# Patient Record
Sex: MALE | Race: BLACK OR AFRICAN AMERICAN | NOT HISPANIC OR LATINO | URBAN - METROPOLITAN AREA
[De-identification: names, ages, dates, MRNs, and addresses within clinical notes are randomized per-mention and may not be internally consistent; named-entity substitution may affect disease eponyms.]

---

## 2020-07-25 ENCOUNTER — TELEPHONE ENCOUNTER (OUTPATIENT)
Dept: URBAN - METROPOLITAN AREA CLINIC 13 | Facility: CLINIC | Age: 75
End: 2020-07-25

## 2020-07-25 RX ORDER — ESOMEPRAZOLE MAGNESIUM 40 MG
TAKE 1 CAPSULE DAILY CAPSULE,DELAYED RELEASE (ENTERIC COATED) ORAL
Refills: 0 | OUTPATIENT
End: 2019-09-16

## 2020-07-25 RX ORDER — PHENAZOPYRIDINE HYDROCHLORIDE 100 MG/1
TAKE 1 CAPSULE DAILY TABLET, COATED ORAL
Refills: 0 | OUTPATIENT
End: 2019-09-16

## 2020-07-25 RX ORDER — VARDENAFIL HYDROCHLORIDE 20 MG/1
TAKE AS DIRECTED TABLET, FILM COATED ORAL
Refills: 0 | OUTPATIENT
Start: 2011-06-21 | End: 2011-07-15

## 2020-07-25 RX ORDER — SILDENAFIL CITRATE 100 MG/1
TAKE 1 TABLET DAILY 1 HOUR BEFORE NEEDED TABLET, FILM COATED ORAL
Refills: 0 | OUTPATIENT
Start: 2011-06-20 | End: 2020-01-17

## 2020-07-25 RX ORDER — AMOXICILLIN 500 MG/1
TAKE 1 TABLET EVERY 8 HOURS TABLET, FILM COATED ORAL
Refills: 0 | OUTPATIENT
End: 2016-12-02

## 2020-07-25 RX ORDER — VARDENAFIL HYDROCHLORIDE 20 MG/1
TAKE 1 TABLET DAILY PT STATES DOSE IS 20MG TABLET, FILM COATED ORAL
Refills: 0 | OUTPATIENT
Start: 2011-07-15 | End: 2012-10-11

## 2020-07-25 RX ORDER — POLYETHYLENE GLYCOL 3350, SODIUM CHLORIDE, SODIUM BICARBONATE AND POTASSIUM CHLORIDE WITH LEMON FLAVOR 420; 11.2; 5.72; 1.48 G/4L; G/4L; G/4L; G/4L
MIX CONTENTS DAY PRIOR TO PROCEDURE, BEGIN DRINKING SOLUTION AT 5PM UNTIL HALF GONE. COMPLETE SOLUTION 6 HOURS PRIOR TO PROCEDURE POWDER, FOR SOLUTION ORAL
Qty: 1 | Refills: 0 | OUTPATIENT
Start: 2019-01-18 | End: 2019-02-26

## 2020-07-25 RX ORDER — LEVOTHYROXINE SODIUM 137 UG/1
TAKE 1 TABLET DAILY TABLET ORAL
Refills: 0 | OUTPATIENT
Start: 2011-06-21 | End: 2011-07-15

## 2020-07-25 RX ORDER — LORAZEPAM 1 MG/1
TAKE 1 TABLET DAILY TABLET ORAL
Refills: 0 | OUTPATIENT
End: 2016-12-02

## 2020-07-25 RX ORDER — FESOTERODINE FUMARATE 8 MG/1
TABLET, FILM COATED, EXTENDED RELEASE ORAL
Qty: 90 | Refills: 0 | OUTPATIENT
Start: 2019-06-04 | End: 2019-09-16

## 2020-07-26 ENCOUNTER — TELEPHONE ENCOUNTER (OUTPATIENT)
Dept: URBAN - METROPOLITAN AREA CLINIC 13 | Facility: CLINIC | Age: 75
End: 2020-07-26

## 2020-07-26 RX ORDER — BENZOCAINE 20 G/100G
GEL, DENTIFRICE ORAL
Qty: 90 | Refills: 0 | Status: ACTIVE | COMMUNITY
Start: 2018-03-12

## 2020-07-26 RX ORDER — ALBUTEROL SULFATE 90 UG/1
AEROSOL, METERED RESPIRATORY (INHALATION)
Qty: 25 | Refills: 0 | Status: ACTIVE | COMMUNITY
Start: 2019-01-17

## 2020-07-26 RX ORDER — CETIRIZINE HYDROCHLORIDE 10 MG/1
TABLET, FILM COATED ORAL
Qty: 90 | Refills: 0 | Status: ACTIVE | COMMUNITY
Start: 2019-01-17

## 2020-07-26 RX ORDER — DUTASTERIDE 0.5 MG
TAKE 1 CAPSULE DAILY CAPSULE ORAL
Refills: 0 | Status: ACTIVE | COMMUNITY

## 2020-07-26 RX ORDER — FAMOTIDINE 40 MG/1
TAKE 1 TABLET BEDTIME TABLET ORAL
Qty: 90 | Refills: 3 | Status: ACTIVE | COMMUNITY
Start: 2020-01-17

## 2020-07-26 RX ORDER — ACYCLOVIR 50 MG/G
OINTMENT TOPICAL
Qty: 15 | Refills: 0 | Status: ACTIVE | COMMUNITY
Start: 2019-09-25

## 2020-07-26 RX ORDER — AMLODIPINE BESYLATE 10 MG/1
TAKE 1 TABLET DAILY TABLET ORAL
Refills: 0 | Status: ACTIVE | COMMUNITY

## 2020-07-26 RX ORDER — FLUTICASONE PROPIONATE 44 UG/1
AEROSOL, METERED RESPIRATORY (INHALATION)
Qty: 10 | Refills: 0 | Status: ACTIVE | COMMUNITY
Start: 2019-01-24

## 2020-07-26 RX ORDER — FLUTICASONE PROPIONATE 50 UG/1
SPRAY, METERED NASAL
Qty: 16 | Refills: 0 | Status: ACTIVE | COMMUNITY
Start: 2019-10-29

## 2020-07-26 RX ORDER — POLYETHYLENE GLYCOL 3350, SODIUM CHLORIDE, SODIUM BICARBONATE AND POTASSIUM CHLORIDE WITH LEMON FLAVOR 420; 11.2; 5.72; 1.48 G/4L; G/4L; G/4L; G/4L
MIX CONTENTS DAY PRIOR TO PROCEDURE, BEGIN DRINKING SOLUTION AT 5PM UNTIL HALF GONE. COMPLETE SOLUTION 6 HOURS PRIOR TO PROCEDURE POWDER, FOR SOLUTION ORAL
Qty: 1 | Refills: 0 | Status: ACTIVE | COMMUNITY
Start: 2019-01-18

## 2020-07-26 RX ORDER — AMOXICILLIN 500 MG/1
CAPSULE ORAL
Qty: 24 | Refills: 0 | Status: ACTIVE | COMMUNITY
Start: 2018-11-29

## 2020-07-26 RX ORDER — MUPIROCIN 20 MG/G
OINTMENT TOPICAL
Qty: 88 | Refills: 0 | Status: ACTIVE | COMMUNITY
Start: 2018-12-12

## 2020-07-26 RX ORDER — WHEAT DEXTRIN 3 G/4 G
TAKE 2 TSP DAILY. AS NEEDED POWDER (GRAM) ORAL
Refills: 0 | Status: ACTIVE | COMMUNITY

## 2020-07-26 RX ORDER — HYDROCHLOROTHIAZIDE 25 MG/1
TAKE 1 TABLET DAILY TABLET ORAL
Refills: 0 | Status: ACTIVE | COMMUNITY

## 2020-07-26 RX ORDER — GABAPENTIN 300 MG/1
TAKE 1 CAPSULE DAILY CAPSULE ORAL
Refills: 0 | Status: ACTIVE | COMMUNITY

## 2020-07-26 RX ORDER — HYDROCODONE BITARTRATE AND ACETAMINOPHEN 10; 325 MG/1; MG/1
TABLET ORAL
Qty: 90 | Refills: 0 | Status: ACTIVE | COMMUNITY
Start: 2018-03-12

## 2020-07-26 RX ORDER — HYDROCODONE BITARTRATE AND ACETAMINOPHEN 5; 325 MG/1; MG/1
TAKE 1 TABLET EVERY 4 TO 6 HOURS AS NEEDED FOR PAIN TABLET ORAL
Refills: 0 | Status: ACTIVE | COMMUNITY

## 2020-07-26 RX ORDER — METHOCARBAMOL 500 MG/1
TAKE 1 TABLET AS NEEDED TABLET, FILM COATED ORAL
Refills: 0 | Status: ACTIVE | COMMUNITY

## 2020-07-26 RX ORDER — DOCUSATE SODIUM 100 MG/1
CAPSULE, LIQUID FILLED ORAL
Qty: 90 | Refills: 0 | Status: ACTIVE | COMMUNITY
Start: 2018-03-12

## 2020-07-26 RX ORDER — LEVOTHYROXINE SODIUM 0.17 MG/1
TAKE 1 TABLET DAILY TABLET ORAL
Refills: 0 | Status: ACTIVE | COMMUNITY

## 2020-07-26 RX ORDER — ATORVASTATIN CALCIUM 20 MG/1
TAKE 1 TABLET DAILY TABLET, FILM COATED ORAL
Refills: 0 | Status: ACTIVE | COMMUNITY

## 2020-07-26 RX ORDER — ONDANSETRON 8 MG/1
TAKE 1 TABLET BEFORE MEALS TABLET ORAL
Qty: 90 | Refills: 2 | Status: ACTIVE | COMMUNITY

## 2020-07-26 RX ORDER — PHENAZOPYRIDINE HYDROCHLORIDE 100 MG/1
TABLET ORAL
Qty: 120 | Refills: 0 | Status: ACTIVE | COMMUNITY
Start: 2018-01-09

## 2020-07-26 RX ORDER — DOCUSATE SODIUM 100 MG
TAKE 1 TABLET DAILY AS DIRECTED TABLET ORAL
Refills: 0 | Status: ACTIVE | COMMUNITY

## 2020-07-26 RX ORDER — FINASTERIDE 5 MG/1
TABLET, FILM COATED ORAL
Qty: 90 | Refills: 0 | Status: ACTIVE | COMMUNITY
Start: 2017-11-29

## 2020-07-26 RX ORDER — ALFUZOSIN HCL 10 MG
TAKE 1 TABLET DAILY TABLET, EXTENDED RELEASE 24 HR ORAL
Refills: 0 | Status: ACTIVE | COMMUNITY

## 2020-07-26 RX ORDER — LISINOPRIL 40 MG/1
TAKE 1 TABLET DAILY TABLET ORAL
Refills: 0 | Status: ACTIVE | COMMUNITY

## 2020-07-26 RX ORDER — LEVOTHYROXINE SODIUM 0.15 MG/1
TABLET ORAL
Qty: 90 | Refills: 0 | Status: ACTIVE | COMMUNITY
Start: 2018-09-14

## 2020-07-26 RX ORDER — SILDENAFIL CITRATE 20 MG/1
TAKE 1 TABLET DAILY. PT STATES UNKNOWN DOSAGE TABLET ORAL
Refills: 0 | Status: ACTIVE | COMMUNITY

## 2020-07-26 RX ORDER — MELOXICAM 15 MG/1
TAKE 1 TABLET DAILY TABLET ORAL
Refills: 0 | Status: ACTIVE | COMMUNITY

## 2020-07-26 RX ORDER — METOPROLOL TARTRATE 50 MG/1
TAKE 1 TABLET DAILY TABLET, FILM COATED ORAL
Refills: 0 | Status: ACTIVE | COMMUNITY

## 2020-07-26 RX ORDER — HYDROCODONE BITARTRATE AND ACETAMINOPHEN 10; 325 MG/1; MG/1
TABLET ORAL
Qty: 30 | Refills: 0 | Status: ACTIVE | COMMUNITY
Start: 2018-08-28

## 2020-07-26 RX ORDER — FINASTERIDE 5 MG/1
TAKE 1 TABLET DAILY TABLET, FILM COATED ORAL
Refills: 0 | Status: ACTIVE | COMMUNITY
Start: 2018-06-22

## 2020-07-26 RX ORDER — SIMVASTATIN 40 MG/1
TAKE 1 TABLET DAILY TABLET, FILM COATED ORAL
Refills: 0 | Status: ACTIVE | COMMUNITY

## 2020-07-26 RX ORDER — SERTRALINE HCL 100 MG
TAKE 1 TABLET DAILY AS DIRECTED TABLET ORAL
Refills: 0 | Status: ACTIVE | COMMUNITY

## 2020-07-26 RX ORDER — PRAMIPEXOLE DIHYDROCHLORIDE 0.5 MG/1
TAKE 1 TABLET AT BEDTIME TABLET ORAL
Refills: 0 | Status: ACTIVE | COMMUNITY

## 2020-07-26 RX ORDER — TADALAFIL 10 MG
TABLET ORAL
Qty: 15 | Refills: 0 | Status: ACTIVE | COMMUNITY
Start: 2018-02-14

## 2020-07-26 RX ORDER — AMOXICILLIN 500 MG/1
CAPSULE ORAL
Qty: 24 | Refills: 0 | Status: ACTIVE | COMMUNITY
Start: 2017-08-14

## 2020-07-26 RX ORDER — OXYBUTYNIN 5 MG/1
TABLET, FILM COATED, EXTENDED RELEASE ORAL
Qty: 180 | Refills: 0 | Status: ACTIVE | COMMUNITY
Start: 2019-03-20

## 2020-07-26 RX ORDER — PANTOPRAZOLE SODIUM 40 MG/1
TAKE 1 TABLET DAILY TABLET, DELAYED RELEASE ORAL
Qty: 1 | Refills: 2 | Status: ACTIVE | COMMUNITY
Start: 2019-09-16

## 2020-07-26 RX ORDER — PANTOPRAZOLE SODIUM 40 MG/1
TAKE 1 TABLET DAILY TABLET, DELAYED RELEASE ORAL
Qty: 5 | Refills: 2 | Status: ACTIVE | COMMUNITY
Start: 2018-03-12

## 2020-07-26 RX ORDER — AMOXICILLIN 500 MG/1
CAPSULE ORAL
Qty: 24 | Refills: 0 | Status: ACTIVE | COMMUNITY
Start: 2018-12-03

## 2020-07-26 RX ORDER — TADALAFIL 20 MG/1
TAKE 1 TABLET DAILY 1 HOUR BEFORE NEEDED TABLET, FILM COATED ORAL
Refills: 0 | Status: ACTIVE | COMMUNITY
Start: 2018-06-14

## 2020-07-26 RX ORDER — ALBUTEROL SULFATE 90 UG/1
AEROSOL, METERED RESPIRATORY (INHALATION)
Qty: 25 | Refills: 0 | Status: ACTIVE | COMMUNITY
Start: 2018-03-12

## 2020-07-26 RX ORDER — LEVOTHYROXINE SODIUM 0.15 MG/1
TABLET ORAL
Qty: 90 | Refills: 0 | Status: ACTIVE | COMMUNITY
Start: 2018-09-20

## 2020-07-26 RX ORDER — MUPIROCIN 20 MG/G
OINTMENT TOPICAL
Qty: 22 | Refills: 0 | Status: ACTIVE | COMMUNITY
Start: 2018-03-12

## 2020-12-30 ENCOUNTER — ERX REFILL RESPONSE (OUTPATIENT)
Age: 75
End: 2020-12-30

## 2020-12-30 RX ORDER — FAMOTIDINE 40 MG/1
TAKE 1 TABLET AT BEDTIME TABLET ORAL
Qty: 90 | Refills: 3

## 2021-04-20 ENCOUNTER — WEB ENCOUNTER (OUTPATIENT)
Dept: URBAN - METROPOLITAN AREA CLINIC 113 | Facility: CLINIC | Age: 76
End: 2021-04-20

## 2021-04-20 ENCOUNTER — OFFICE VISIT (OUTPATIENT)
Dept: URBAN - METROPOLITAN AREA CLINIC 113 | Facility: CLINIC | Age: 76
End: 2021-04-20
Payer: MEDICARE

## 2021-04-20 VITALS
HEIGHT: 68 IN | DIASTOLIC BLOOD PRESSURE: 72 MMHG | HEART RATE: 95 BPM | SYSTOLIC BLOOD PRESSURE: 115 MMHG | WEIGHT: 151 LBS | TEMPERATURE: 98.4 F | BODY MASS INDEX: 22.88 KG/M2

## 2021-04-20 DIAGNOSIS — K57.32 DIVERTICULITIS LARGE INTESTINE: ICD-10-CM

## 2021-04-20 DIAGNOSIS — K57.92 DIVERTICULITIS: ICD-10-CM

## 2021-04-20 DIAGNOSIS — R10.31 RIGHT LOWER QUADRANT ABDOMINAL PAIN: ICD-10-CM

## 2021-04-20 DIAGNOSIS — K59.01 SLOW TRANSIT CONSTIPATION: ICD-10-CM

## 2021-04-20 DIAGNOSIS — K62.5 BRIGHT RED BLOOD PER RECTUM: ICD-10-CM

## 2021-04-20 PROCEDURE — 99214 OFFICE O/P EST MOD 30 MIN: CPT | Performed by: NURSE PRACTITIONER

## 2021-04-20 PROCEDURE — 74177 CT ABD & PELVIS W/CONTRAST: CPT | Performed by: NURSE PRACTITIONER

## 2021-04-20 RX ORDER — METHOCARBAMOL 500 MG/1
1 TABLET TABLET ORAL ONCE DAILY
Status: ACTIVE | COMMUNITY

## 2021-04-20 RX ORDER — MONTELUKAST SODIUM 10 MG/1
1 TABLET TABLET, FILM COATED ORAL ONCE A DAY
Status: ACTIVE | COMMUNITY

## 2021-04-20 RX ORDER — SILDENAFIL CITRATE 20 MG/1
TAKE 1 TABLET DAILY. PT STATES UNKNOWN DOSAGE TABLET ORAL
Refills: 0 | Status: ON HOLD | COMMUNITY

## 2021-04-20 RX ORDER — AMLODIPINE BESYLATE 10 MG/1
TAKE 1 TABLET DAILY TABLET ORAL
Refills: 0 | Status: ACTIVE | COMMUNITY

## 2021-04-20 RX ORDER — GABAPENTIN 300 MG/1
TAKE 1 CAPSULE DAILY CAPSULE ORAL
Refills: 0 | Status: ACTIVE | COMMUNITY

## 2021-04-20 RX ORDER — PHENAZOPYRIDINE HYDROCHLORIDE 100 MG/1
TABLET ORAL
Qty: 120 | Refills: 0 | Status: ON HOLD | COMMUNITY
Start: 2018-01-09

## 2021-04-20 RX ORDER — ONDANSETRON 8 MG/1
TAKE 1 TABLET BEFORE MEALS TABLET ORAL
Qty: 90 | Refills: 2 | Status: ACTIVE | COMMUNITY

## 2021-04-20 RX ORDER — METHOCARBAMOL 500 MG/1
TAKE 1 TABLET AS NEEDED TABLET, FILM COATED ORAL
Refills: 0 | Status: ON HOLD | COMMUNITY

## 2021-04-20 RX ORDER — BENZOCAINE 20 G/100G
GEL, DENTIFRICE ORAL
Qty: 90 | Refills: 0 | Status: ON HOLD | COMMUNITY
Start: 2018-03-12

## 2021-04-20 RX ORDER — MELOXICAM 15 MG/1
TAKE 1 TABLET DAILY TABLET ORAL
Refills: 0 | Status: ON HOLD | COMMUNITY

## 2021-04-20 RX ORDER — METOPROLOL TARTRATE 50 MG/1
TAKE 1 TABLET DAILY TABLET, FILM COATED ORAL
Refills: 0 | Status: ON HOLD | COMMUNITY

## 2021-04-20 RX ORDER — PANTOPRAZOLE SODIUM 40 MG/1
TAKE 1 TABLET DAILY TABLET, DELAYED RELEASE ORAL
Qty: 5 | Refills: 2 | Status: ACTIVE | COMMUNITY
Start: 2018-03-12

## 2021-04-20 RX ORDER — WHEAT DEXTRIN 3 G/4 G
TAKE 2 TSP DAILY. AS NEEDED POWDER (GRAM) ORAL
Refills: 0 | Status: ON HOLD | COMMUNITY

## 2021-04-20 RX ORDER — DUTASTERIDE 0.5 MG
TAKE 1 CAPSULE DAILY CAPSULE ORAL
Refills: 0 | Status: ON HOLD | COMMUNITY

## 2021-04-20 RX ORDER — OXYBUTYNIN 5 MG/1
TABLET, FILM COATED, EXTENDED RELEASE ORAL
Qty: 180 | Refills: 0 | Status: ON HOLD | COMMUNITY
Start: 2019-03-20

## 2021-04-20 RX ORDER — DOCUSATE SODIUM 100 MG
TAKE 1 TABLET DAILY AS DIRECTED TABLET ORAL
Refills: 0 | Status: ACTIVE | COMMUNITY

## 2021-04-20 RX ORDER — DICYCLOMINE HYDROCHLORIDE 10 MG/1
1 CAPSULE CAPSULE ORAL
Qty: 60 | Refills: 1 | OUTPATIENT
Start: 2021-04-20 | End: 2021-06-19

## 2021-04-20 RX ORDER — SERTRALINE HCL 100 MG
TAKE 1 TABLET DAILY AS DIRECTED TABLET ORAL
Refills: 0 | Status: ON HOLD | COMMUNITY

## 2021-04-20 RX ORDER — FAMOTIDINE 40 MG/1
TAKE 1 TABLET AT BEDTIME TABLET ORAL
Qty: 90 | Refills: 3 | Status: ON HOLD | COMMUNITY

## 2021-04-20 RX ORDER — TADALAFIL 10 MG
TABLET ORAL
Qty: 15 | Refills: 0 | Status: ON HOLD | COMMUNITY
Start: 2018-02-14

## 2021-04-20 RX ORDER — PRAMIPEXOLE DIHYDROCHLORIDE 0.5 MG/1
TAKE 1 TABLET AT BEDTIME TABLET ORAL
Refills: 0 | Status: ON HOLD | COMMUNITY

## 2021-04-20 RX ORDER — HYDROCHLOROTHIAZIDE 25 MG/1
TAKE 1 TABLET DAILY TABLET ORAL
Refills: 0 | Status: ACTIVE | COMMUNITY

## 2021-04-20 RX ORDER — TADALAFIL 20 MG/1
TAKE 1 TABLET DAILY 1 HOUR BEFORE NEEDED TABLET, FILM COATED ORAL
Refills: 0 | Status: ON HOLD | COMMUNITY
Start: 2018-06-14

## 2021-04-20 RX ORDER — FLUTICASONE PROPIONATE 50 UG/1
SPRAY, METERED NASAL
Qty: 16 | Refills: 0 | Status: ACTIVE | COMMUNITY
Start: 2019-10-29

## 2021-04-20 RX ORDER — LEVOTHYROXINE SODIUM 0.17 MG/1
TAKE 1 TABLET DAILY TABLET ORAL
Refills: 0 | Status: ON HOLD | COMMUNITY

## 2021-04-20 RX ORDER — TRAZODONE HYDROCHLORIDE 100 MG/1
2 TABLET AT BEDTIME TABLET, FILM COATED ORAL ONCE A DAY
Status: ACTIVE | COMMUNITY

## 2021-04-20 RX ORDER — POLYETHYLENE GLYCOL 3350, SODIUM CHLORIDE, SODIUM BICARBONATE AND POTASSIUM CHLORIDE WITH LEMON FLAVOR 420; 11.2; 5.72; 1.48 G/4L; G/4L; G/4L; G/4L
MIX CONTENTS DAY PRIOR TO PROCEDURE, BEGIN DRINKING SOLUTION AT 5PM UNTIL HALF GONE. COMPLETE SOLUTION 6 HOURS PRIOR TO PROCEDURE POWDER, FOR SOLUTION ORAL
Qty: 1 | Refills: 0 | Status: ON HOLD | COMMUNITY
Start: 2019-01-18

## 2021-04-20 RX ORDER — HYDROCODONE BITARTRATE AND ACETAMINOPHEN 10; 325 MG/1; MG/1
TABLET ORAL
Qty: 90 | Refills: 0 | Status: ON HOLD | COMMUNITY
Start: 2018-03-12

## 2021-04-20 RX ORDER — LISINOPRIL 40 MG/1
TAKE 1 TABLET DAILY TABLET ORAL
Refills: 0 | Status: ACTIVE | COMMUNITY

## 2021-04-20 RX ORDER — LEVOTHYROXINE SODIUM 0.15 MG/1
TABLET ORAL
Qty: 90 | Refills: 0 | Status: ACTIVE | COMMUNITY
Start: 2018-09-14

## 2021-04-20 RX ORDER — AMOXICILLIN 500 MG/1
CAPSULE ORAL
Qty: 24 | Refills: 0 | Status: ON HOLD | COMMUNITY
Start: 2017-08-14

## 2021-04-20 RX ORDER — SERTRALINE 100 MG/1
1 TABLET TABLET, FILM COATED ORAL ONCE A DAY
Status: ACTIVE | COMMUNITY

## 2021-04-20 RX ORDER — CETIRIZINE HYDROCHLORIDE 10 MG/1
TABLET, FILM COATED ORAL
Qty: 90 | Refills: 0 | Status: ON HOLD | COMMUNITY
Start: 2019-01-17

## 2021-04-20 RX ORDER — ATORVASTATIN CALCIUM 20 MG/1
TAKE 1 TABLET DAILY TABLET, FILM COATED ORAL
Refills: 0 | Status: ACTIVE | COMMUNITY

## 2021-04-20 RX ORDER — FLUTICASONE PROPIONATE 44 UG/1
AEROSOL, METERED RESPIRATORY (INHALATION)
Qty: 10 | Refills: 0 | Status: ON HOLD | COMMUNITY
Start: 2019-01-24

## 2021-04-20 RX ORDER — SIMVASTATIN 40 MG/1
TAKE 1 TABLET DAILY TABLET, FILM COATED ORAL
Refills: 0 | Status: ON HOLD | COMMUNITY

## 2021-04-20 RX ORDER — ALBUTEROL SULFATE 90 UG/1
AEROSOL, METERED RESPIRATORY (INHALATION)
Qty: 25 | Refills: 0 | Status: ON HOLD | COMMUNITY
Start: 2018-03-12

## 2021-04-20 RX ORDER — FINASTERIDE 5 MG/1
TABLET, FILM COATED ORAL
Qty: 90 | Refills: 0 | Status: ACTIVE | COMMUNITY
Start: 2017-11-29

## 2021-04-20 RX ORDER — DOCUSATE SODIUM 100 MG/1
CAPSULE, LIQUID FILLED ORAL
Qty: 90 | Refills: 0 | Status: ON HOLD | COMMUNITY
Start: 2018-03-12

## 2021-04-20 RX ORDER — PRAMIPEXOLE DIHYDROCHLORIDE 0.5 MG/1
1 TABLET TABLET ORAL ONCE A DAY
Status: ACTIVE | COMMUNITY

## 2021-04-20 RX ORDER — MUPIROCIN 20 MG/G
OINTMENT TOPICAL
Qty: 22 | Refills: 0 | Status: ACTIVE | COMMUNITY
Start: 2018-03-12

## 2021-04-20 RX ORDER — HYDROCODONE BITARTRATE AND ACETAMINOPHEN 5; 325 MG/1; MG/1
TAKE 1 TABLET EVERY 4 TO 6 HOURS AS NEEDED FOR PAIN TABLET ORAL
Refills: 0 | Status: ON HOLD | COMMUNITY

## 2021-04-20 RX ORDER — ACYCLOVIR 50 MG/G
OINTMENT TOPICAL
Qty: 15 | Refills: 0 | Status: ACTIVE | COMMUNITY
Start: 2019-09-25

## 2021-04-20 RX ORDER — ALFUZOSIN HCL 10 MG
TAKE 1 TABLET DAILY TABLET, EXTENDED RELEASE 24 HR ORAL
Refills: 0 | Status: ON HOLD | COMMUNITY

## 2021-04-20 RX ORDER — DUTASTERIDE 0.5 MG/1
1 CAPSULE CAPSULE, LIQUID FILLED ORAL ONCE A DAY
Status: ACTIVE | COMMUNITY

## 2021-04-20 RX ORDER — TADALAFIL 20 MG/1
1 TABLET TABLET, COATED ORAL ONCE A DAY
Status: ACTIVE | COMMUNITY

## 2021-04-20 NOTE — HPI-OTHER HISTORIES
EGD 9/20/19 revealed an irregular Z line, [place]-colored mucosa from 39-40 cm, gastroesophageal flap valve classified as Hill grade II, small hiatal hernia, diffuse moderate gastritis and normal duodenum. GE junction biopsies revealed reflux type changes, negative for Petit's esophagus, gastric biopsies revealed multifocal atrophic gastritis with extensive complete intestinal metaplasia, negative for H. pylori or malignancy.  Colonoscopy (8/5/14) by Dr. Tapia revealed multiple diverticula in the entire colon and otherwise normal colon. from home c/o fever  dehydration   URI   back pain

## 2021-04-20 NOTE — HPI-TODAY'S VISIT:
75-year-old male with a history of hypertension, stage III chronic kidney disease, sleep apnea, thyroid cancer, prostate cancer, GERD, presenting for evaluation of diverticulitis.   He was last seen 1/17/2020 for follow-up regarding GERD, with persistent symptoms despite PPI.  He was instructed to continue pantoprazole 40 mg daily, and famotidine 40 mg was added at bedtime.  In early March, he experienced an exacerbation of right-sided abdominal pain and rectal bleeding.  He was empirically treated for diverticulitis with a 7-day course of Cipro and Flagyl per his PCP.  His red blood per rectum resolved following completion of antibiotics, but abdominal pain persists.  He complains of persistent sharp right lower quadrant discomfort which is not related to eating or defecation.  He reports associated nausea, with several episodes of vomiting at onset.  He is having a nonbloody stool every 2 days with Colace.  No recent labs or abdominal imaging.

## 2021-04-22 ENCOUNTER — TELEPHONE ENCOUNTER (OUTPATIENT)
Dept: URBAN - METROPOLITAN AREA CLINIC 113 | Facility: CLINIC | Age: 76
End: 2021-04-22

## 2021-05-03 ENCOUNTER — TELEPHONE ENCOUNTER (OUTPATIENT)
Dept: URBAN - METROPOLITAN AREA CLINIC 113 | Facility: CLINIC | Age: 76
End: 2021-05-03

## 2021-05-13 ENCOUNTER — TELEPHONE ENCOUNTER (OUTPATIENT)
Dept: URBAN - METROPOLITAN AREA CLINIC 113 | Facility: CLINIC | Age: 76
End: 2021-05-13

## 2021-05-17 PROBLEM — 307496006 DIVERTICULITIS: Status: ACTIVE | Noted: 2021-04-20

## 2021-05-17 PROBLEM — 35298007 SLOW TRANSIT CONSTIPATION: Status: ACTIVE | Noted: 2021-04-20

## 2021-05-19 ENCOUNTER — OFFICE VISIT (OUTPATIENT)
Dept: URBAN - METROPOLITAN AREA SURGERY CENTER 25 | Facility: SURGERY CENTER | Age: 76
End: 2021-05-19
Payer: MEDICARE

## 2021-05-19 ENCOUNTER — CLAIMS CREATED FROM THE CLAIM WINDOW (OUTPATIENT)
Dept: URBAN - METROPOLITAN AREA CLINIC 4 | Facility: CLINIC | Age: 76
End: 2021-05-19
Payer: MEDICARE

## 2021-05-19 DIAGNOSIS — C88.4 EXTRANODAL MARGINAL ZONE B-CELL LYMPHOMA OF MUCOSA-ASSOCIATED LYMPHOID TISSUE [MALT-LYMPHOMA]: ICD-10-CM

## 2021-05-19 DIAGNOSIS — R93.3 ABN FINDINGS-GI TRACT: ICD-10-CM

## 2021-05-19 DIAGNOSIS — K31.89 ACQUIRED DEFORMITY OF DUODENUM: ICD-10-CM

## 2021-05-19 DIAGNOSIS — K31.89 GASTRIC PERFORATION WITHOUT ULCER: ICD-10-CM

## 2021-05-19 DIAGNOSIS — K29.50 CHRONIC GASTRITIS: ICD-10-CM

## 2021-05-19 DIAGNOSIS — K22.8 OTHER SPECIFIED DISEASES OF ESOPHAGUS: ICD-10-CM

## 2021-05-19 DIAGNOSIS — K29.40 ATROPHIC GASTRITIS: ICD-10-CM

## 2021-05-19 PROCEDURE — 88312 SPECIAL STAINS GROUP 1: CPT | Performed by: PATHOLOGY

## 2021-05-19 PROCEDURE — 43239 EGD BIOPSY SINGLE/MULTIPLE: CPT | Performed by: INTERNAL MEDICINE

## 2021-05-19 PROCEDURE — G8907 PT DOC NO EVENTS ON DISCHARG: HCPCS | Performed by: INTERNAL MEDICINE

## 2021-05-19 PROCEDURE — 88305 TISSUE EXAM BY PATHOLOGIST: CPT | Performed by: PATHOLOGY

## 2021-05-19 RX ORDER — SERTRALINE HCL 100 MG
TAKE 1 TABLET DAILY AS DIRECTED TABLET ORAL
Refills: 0 | Status: ON HOLD | COMMUNITY

## 2021-05-19 RX ORDER — WHEAT DEXTRIN 3 G/4 G
TAKE 2 TSP DAILY. AS NEEDED POWDER (GRAM) ORAL
Refills: 0 | Status: ON HOLD | COMMUNITY

## 2021-05-19 RX ORDER — LISINOPRIL 40 MG/1
TAKE 1 TABLET DAILY TABLET ORAL
Refills: 0 | Status: ACTIVE | COMMUNITY

## 2021-05-19 RX ORDER — FLUTICASONE PROPIONATE 50 UG/1
SPRAY, METERED NASAL
Qty: 16 | Refills: 0 | Status: ACTIVE | COMMUNITY
Start: 2019-10-29

## 2021-05-19 RX ORDER — ALBUTEROL SULFATE 90 UG/1
AEROSOL, METERED RESPIRATORY (INHALATION)
Qty: 25 | Refills: 0 | Status: ON HOLD | COMMUNITY
Start: 2018-03-12

## 2021-05-19 RX ORDER — TADALAFIL 20 MG/1
1 TABLET TABLET, COATED ORAL ONCE A DAY
Status: ACTIVE | COMMUNITY

## 2021-05-19 RX ORDER — LEVOTHYROXINE SODIUM 0.17 MG/1
TAKE 1 TABLET DAILY TABLET ORAL
Refills: 0 | Status: ON HOLD | COMMUNITY

## 2021-05-19 RX ORDER — SERTRALINE 100 MG/1
1 TABLET TABLET, FILM COATED ORAL ONCE A DAY
Status: ACTIVE | COMMUNITY

## 2021-05-19 RX ORDER — OXYBUTYNIN 5 MG/1
TABLET, FILM COATED, EXTENDED RELEASE ORAL
Qty: 180 | Refills: 0 | Status: ON HOLD | COMMUNITY
Start: 2019-03-20

## 2021-05-19 RX ORDER — MONTELUKAST SODIUM 10 MG/1
1 TABLET TABLET, FILM COATED ORAL ONCE A DAY
Status: ACTIVE | COMMUNITY

## 2021-05-19 RX ORDER — PRAMIPEXOLE DIHYDROCHLORIDE 0.5 MG/1
1 TABLET TABLET ORAL ONCE A DAY
Status: ACTIVE | COMMUNITY

## 2021-05-19 RX ORDER — FLUTICASONE PROPIONATE 44 UG/1
AEROSOL, METERED RESPIRATORY (INHALATION)
Qty: 10 | Refills: 0 | Status: ON HOLD | COMMUNITY
Start: 2019-01-24

## 2021-05-19 RX ORDER — AMOXICILLIN 500 MG/1
CAPSULE ORAL
Qty: 24 | Refills: 0 | Status: ON HOLD | COMMUNITY
Start: 2017-08-14

## 2021-05-19 RX ORDER — HYDROCHLOROTHIAZIDE 25 MG/1
TAKE 1 TABLET DAILY TABLET ORAL
Refills: 0 | Status: ACTIVE | COMMUNITY

## 2021-05-19 RX ORDER — MUPIROCIN 20 MG/G
OINTMENT TOPICAL
Qty: 22 | Refills: 0 | Status: ACTIVE | COMMUNITY
Start: 2018-03-12

## 2021-05-19 RX ORDER — HYDROCODONE BITARTRATE AND ACETAMINOPHEN 5; 325 MG/1; MG/1
TAKE 1 TABLET EVERY 4 TO 6 HOURS AS NEEDED FOR PAIN TABLET ORAL
Refills: 0 | Status: ON HOLD | COMMUNITY

## 2021-05-19 RX ORDER — DUTASTERIDE 0.5 MG
TAKE 1 CAPSULE DAILY CAPSULE ORAL
Refills: 0 | Status: ON HOLD | COMMUNITY

## 2021-05-19 RX ORDER — HYDROCODONE BITARTRATE AND ACETAMINOPHEN 10; 325 MG/1; MG/1
TABLET ORAL
Qty: 90 | Refills: 0 | Status: ON HOLD | COMMUNITY
Start: 2018-03-12

## 2021-05-19 RX ORDER — ONDANSETRON 8 MG/1
TAKE 1 TABLET BEFORE MEALS TABLET ORAL
Qty: 90 | Refills: 2 | Status: ACTIVE | COMMUNITY

## 2021-05-19 RX ORDER — LEVOTHYROXINE SODIUM 0.15 MG/1
TABLET ORAL
Qty: 90 | Refills: 0 | Status: ACTIVE | COMMUNITY
Start: 2018-09-14

## 2021-05-19 RX ORDER — GABAPENTIN 300 MG/1
TAKE 1 CAPSULE DAILY CAPSULE ORAL
Refills: 0 | Status: ACTIVE | COMMUNITY

## 2021-05-19 RX ORDER — DOCUSATE SODIUM 100 MG/1
CAPSULE, LIQUID FILLED ORAL
Qty: 90 | Refills: 0 | Status: ON HOLD | COMMUNITY
Start: 2018-03-12

## 2021-05-19 RX ORDER — CETIRIZINE HYDROCHLORIDE 10 MG/1
TABLET, FILM COATED ORAL
Qty: 90 | Refills: 0 | Status: ON HOLD | COMMUNITY
Start: 2019-01-17

## 2021-05-19 RX ORDER — PHENAZOPYRIDINE HYDROCHLORIDE 100 MG/1
TABLET ORAL
Qty: 120 | Refills: 0 | Status: ON HOLD | COMMUNITY
Start: 2018-01-09

## 2021-05-19 RX ORDER — FINASTERIDE 5 MG/1
TABLET, FILM COATED ORAL
Qty: 90 | Refills: 0 | Status: ACTIVE | COMMUNITY
Start: 2017-11-29

## 2021-05-19 RX ORDER — PANTOPRAZOLE SODIUM 40 MG/1
TAKE 1 TABLET DAILY TABLET, DELAYED RELEASE ORAL
Qty: 5 | Refills: 2 | Status: ACTIVE | COMMUNITY
Start: 2018-03-12

## 2021-05-19 RX ORDER — FAMOTIDINE 40 MG/1
TAKE 1 TABLET AT BEDTIME TABLET ORAL
Qty: 90 | Refills: 3 | Status: ON HOLD | COMMUNITY

## 2021-05-19 RX ORDER — ATORVASTATIN CALCIUM 20 MG/1
TAKE 1 TABLET DAILY TABLET, FILM COATED ORAL
Refills: 0 | Status: ACTIVE | COMMUNITY

## 2021-05-19 RX ORDER — DUTASTERIDE 0.5 MG/1
1 CAPSULE CAPSULE, LIQUID FILLED ORAL ONCE A DAY
Status: ACTIVE | COMMUNITY

## 2021-05-19 RX ORDER — SILDENAFIL CITRATE 20 MG/1
TAKE 1 TABLET DAILY. PT STATES UNKNOWN DOSAGE TABLET ORAL
Refills: 0 | Status: ON HOLD | COMMUNITY

## 2021-05-19 RX ORDER — DOCUSATE SODIUM 100 MG
TAKE 1 TABLET DAILY AS DIRECTED TABLET ORAL
Refills: 0 | Status: ACTIVE | COMMUNITY

## 2021-05-19 RX ORDER — METHOCARBAMOL 500 MG/1
TAKE 1 TABLET AS NEEDED TABLET, FILM COATED ORAL
Refills: 0 | Status: ON HOLD | COMMUNITY

## 2021-05-19 RX ORDER — METOPROLOL TARTRATE 50 MG/1
TAKE 1 TABLET DAILY TABLET, FILM COATED ORAL
Refills: 0 | Status: ON HOLD | COMMUNITY

## 2021-05-19 RX ORDER — TADALAFIL 20 MG/1
TAKE 1 TABLET DAILY 1 HOUR BEFORE NEEDED TABLET, FILM COATED ORAL
Refills: 0 | Status: ON HOLD | COMMUNITY
Start: 2018-06-14

## 2021-05-19 RX ORDER — PRAMIPEXOLE DIHYDROCHLORIDE 0.5 MG/1
TAKE 1 TABLET AT BEDTIME TABLET ORAL
Refills: 0 | Status: ON HOLD | COMMUNITY

## 2021-05-19 RX ORDER — POLYETHYLENE GLYCOL 3350, SODIUM CHLORIDE, SODIUM BICARBONATE AND POTASSIUM CHLORIDE WITH LEMON FLAVOR 420; 11.2; 5.72; 1.48 G/4L; G/4L; G/4L; G/4L
MIX CONTENTS DAY PRIOR TO PROCEDURE, BEGIN DRINKING SOLUTION AT 5PM UNTIL HALF GONE. COMPLETE SOLUTION 6 HOURS PRIOR TO PROCEDURE POWDER, FOR SOLUTION ORAL
Qty: 1 | Refills: 0 | Status: ON HOLD | COMMUNITY
Start: 2019-01-18

## 2021-05-19 RX ORDER — ACYCLOVIR 50 MG/G
OINTMENT TOPICAL
Qty: 15 | Refills: 0 | Status: ACTIVE | COMMUNITY
Start: 2019-09-25

## 2021-05-19 RX ORDER — DICYCLOMINE HYDROCHLORIDE 10 MG/1
1 CAPSULE CAPSULE ORAL
Qty: 60 | Refills: 1 | Status: ACTIVE | COMMUNITY
Start: 2021-04-20 | End: 2021-06-19

## 2021-05-19 RX ORDER — TADALAFIL 10 MG
TABLET ORAL
Qty: 15 | Refills: 0 | Status: ON HOLD | COMMUNITY
Start: 2018-02-14

## 2021-05-19 RX ORDER — AMLODIPINE BESYLATE 10 MG/1
TAKE 1 TABLET DAILY TABLET ORAL
Refills: 0 | Status: ACTIVE | COMMUNITY

## 2021-05-19 RX ORDER — SIMVASTATIN 40 MG/1
TAKE 1 TABLET DAILY TABLET, FILM COATED ORAL
Refills: 0 | Status: ON HOLD | COMMUNITY

## 2021-05-19 RX ORDER — BENZOCAINE 20 G/100G
GEL, DENTIFRICE ORAL
Qty: 90 | Refills: 0 | Status: ON HOLD | COMMUNITY
Start: 2018-03-12

## 2021-05-19 RX ORDER — METHOCARBAMOL 500 MG/1
1 TABLET TABLET ORAL ONCE DAILY
Status: ACTIVE | COMMUNITY

## 2021-05-19 RX ORDER — MELOXICAM 15 MG/1
TAKE 1 TABLET DAILY TABLET ORAL
Refills: 0 | Status: ON HOLD | COMMUNITY

## 2021-05-19 RX ORDER — ALFUZOSIN HCL 10 MG
TAKE 1 TABLET DAILY TABLET, EXTENDED RELEASE 24 HR ORAL
Refills: 0 | Status: ON HOLD | COMMUNITY

## 2021-05-19 RX ORDER — TRAZODONE HYDROCHLORIDE 100 MG/1
2 TABLET AT BEDTIME TABLET, FILM COATED ORAL ONCE A DAY
Status: ACTIVE | COMMUNITY

## 2021-06-01 ENCOUNTER — OFFICE VISIT (OUTPATIENT)
Dept: URBAN - METROPOLITAN AREA CLINIC 113 | Facility: CLINIC | Age: 76
End: 2021-06-01
Payer: MEDICARE

## 2021-06-01 VITALS
SYSTOLIC BLOOD PRESSURE: 139 MMHG | BODY MASS INDEX: 22.58 KG/M2 | TEMPERATURE: 98.4 F | WEIGHT: 149 LBS | DIASTOLIC BLOOD PRESSURE: 74 MMHG | HEIGHT: 68 IN | HEART RATE: 77 BPM

## 2021-06-01 DIAGNOSIS — D64.9 NORMOCYTIC ANEMIA: ICD-10-CM

## 2021-06-01 DIAGNOSIS — K62.5 BRIGHT RED BLOOD PER RECTUM: ICD-10-CM

## 2021-06-01 DIAGNOSIS — K21.9 GERD: ICD-10-CM

## 2021-06-01 DIAGNOSIS — D61.818 PANCYTOPENIA: ICD-10-CM

## 2021-06-01 PROCEDURE — 99213 OFFICE O/P EST LOW 20 MIN: CPT | Performed by: INTERNAL MEDICINE

## 2021-06-01 RX ORDER — PANTOPRAZOLE SODIUM 40 MG/1
TAKE 1 TABLET DAILY TABLET, DELAYED RELEASE ORAL
Qty: 5 | Refills: 2 | Status: ACTIVE | COMMUNITY
Start: 2018-03-12

## 2021-06-01 RX ORDER — LISINOPRIL 40 MG/1
TAKE 1 TABLET DAILY TABLET ORAL
Refills: 0 | Status: ACTIVE | COMMUNITY

## 2021-06-01 RX ORDER — METHOCARBAMOL 500 MG/1
TAKE 1 TABLET AS NEEDED TABLET, FILM COATED ORAL
Refills: 0 | Status: ON HOLD | COMMUNITY

## 2021-06-01 RX ORDER — TADALAFIL 20 MG/1
TAKE 1 TABLET DAILY 1 HOUR BEFORE NEEDED TABLET, FILM COATED ORAL
Refills: 0 | Status: ON HOLD | COMMUNITY
Start: 2018-06-14

## 2021-06-01 RX ORDER — WHEAT DEXTRIN 3 G/4 G
TAKE 2 TSP DAILY. AS NEEDED POWDER (GRAM) ORAL
Refills: 0 | Status: ON HOLD | COMMUNITY

## 2021-06-01 RX ORDER — FLUTICASONE PROPIONATE 44 UG/1
AEROSOL, METERED RESPIRATORY (INHALATION)
Qty: 10 | Refills: 0 | Status: ON HOLD | COMMUNITY
Start: 2019-01-24

## 2021-06-01 RX ORDER — GABAPENTIN 300 MG/1
TAKE 1 CAPSULE DAILY CAPSULE ORAL
Refills: 0 | Status: ACTIVE | COMMUNITY

## 2021-06-01 RX ORDER — FLUTICASONE PROPIONATE 50 UG/1
SPRAY, METERED NASAL
Qty: 16 | Refills: 0 | Status: ACTIVE | COMMUNITY
Start: 2019-10-29

## 2021-06-01 RX ORDER — SERTRALINE HCL 100 MG
TAKE 1 TABLET DAILY AS DIRECTED TABLET ORAL
Refills: 0 | Status: ON HOLD | COMMUNITY

## 2021-06-01 RX ORDER — SILDENAFIL CITRATE 20 MG/1
TAKE 1 TABLET DAILY. PT STATES UNKNOWN DOSAGE TABLET ORAL
Refills: 0 | Status: ON HOLD | COMMUNITY

## 2021-06-01 RX ORDER — LEVOTHYROXINE SODIUM 0.15 MG/1
TABLET ORAL
Qty: 90 | Refills: 0 | Status: ACTIVE | COMMUNITY
Start: 2018-09-14

## 2021-06-01 RX ORDER — TRAZODONE HYDROCHLORIDE 100 MG/1
2 TABLET AT BEDTIME TABLET, FILM COATED ORAL ONCE A DAY
Status: ACTIVE | COMMUNITY

## 2021-06-01 RX ORDER — PHENAZOPYRIDINE HYDROCHLORIDE 100 MG/1
TABLET ORAL
Qty: 120 | Refills: 0 | Status: ON HOLD | COMMUNITY
Start: 2018-01-09

## 2021-06-01 RX ORDER — ONDANSETRON 8 MG/1
TAKE 1 TABLET BEFORE MEALS TABLET ORAL
Qty: 90 | Refills: 2 | Status: ACTIVE | COMMUNITY

## 2021-06-01 RX ORDER — ATORVASTATIN CALCIUM 20 MG/1
TAKE 1 TABLET DAILY TABLET, FILM COATED ORAL
Refills: 0 | Status: ACTIVE | COMMUNITY

## 2021-06-01 RX ORDER — SERTRALINE 100 MG/1
1 TABLET TABLET, FILM COATED ORAL ONCE A DAY
Status: ACTIVE | COMMUNITY

## 2021-06-01 RX ORDER — MELOXICAM 15 MG/1
TAKE 1 TABLET DAILY TABLET ORAL
Refills: 0 | Status: ON HOLD | COMMUNITY

## 2021-06-01 RX ORDER — FINASTERIDE 5 MG/1
TABLET, FILM COATED ORAL
Qty: 90 | Refills: 0 | Status: ACTIVE | COMMUNITY
Start: 2017-11-29

## 2021-06-01 RX ORDER — FAMOTIDINE 40 MG/1
TAKE 1 TABLET AT BEDTIME TABLET ORAL
Qty: 90 | Refills: 3 | Status: ON HOLD | COMMUNITY

## 2021-06-01 RX ORDER — ALFUZOSIN HCL 10 MG
TAKE 1 TABLET DAILY TABLET, EXTENDED RELEASE 24 HR ORAL
Refills: 0 | Status: ON HOLD | COMMUNITY

## 2021-06-01 RX ORDER — PRAMIPEXOLE DIHYDROCHLORIDE 0.5 MG/1
TAKE 1 TABLET AT BEDTIME TABLET ORAL
Refills: 0 | Status: ON HOLD | COMMUNITY

## 2021-06-01 RX ORDER — AMLODIPINE BESYLATE 10 MG/1
TAKE 1 TABLET DAILY TABLET ORAL
Refills: 0 | Status: ACTIVE | COMMUNITY

## 2021-06-01 RX ORDER — METHOCARBAMOL 500 MG/1
1 TABLET TABLET ORAL ONCE DAILY
Status: ACTIVE | COMMUNITY

## 2021-06-01 RX ORDER — DOCUSATE SODIUM 100 MG
TAKE 1 TABLET DAILY AS DIRECTED TABLET ORAL
Refills: 0 | Status: ACTIVE | COMMUNITY

## 2021-06-01 RX ORDER — BENZOCAINE 20 G/100G
GEL, DENTIFRICE ORAL
Qty: 90 | Refills: 0 | Status: ON HOLD | COMMUNITY
Start: 2018-03-12

## 2021-06-01 RX ORDER — HYDROCODONE BITARTRATE AND ACETAMINOPHEN 10; 325 MG/1; MG/1
TABLET ORAL
Qty: 90 | Refills: 0 | Status: ON HOLD | COMMUNITY
Start: 2018-03-12

## 2021-06-01 RX ORDER — POLYETHYLENE GLYCOL 3350, SODIUM CHLORIDE, SODIUM BICARBONATE AND POTASSIUM CHLORIDE 420G
AS DIRECTED KIT ORAL ONCE
Qty: 420 GRAM | Refills: 0 | OUTPATIENT
Start: 2021-06-01 | End: 2021-06-02

## 2021-06-01 RX ORDER — TADALAFIL 10 MG
TABLET ORAL
Qty: 15 | Refills: 0 | Status: ON HOLD | COMMUNITY
Start: 2018-02-14

## 2021-06-01 RX ORDER — DOCUSATE SODIUM 100 MG/1
CAPSULE, LIQUID FILLED ORAL
Qty: 90 | Refills: 0 | Status: ON HOLD | COMMUNITY
Start: 2018-03-12

## 2021-06-01 RX ORDER — AMOXICILLIN 500 MG/1
CAPSULE ORAL
Qty: 24 | Refills: 0 | Status: ON HOLD | COMMUNITY
Start: 2017-08-14

## 2021-06-01 RX ORDER — MUPIROCIN 20 MG/G
OINTMENT TOPICAL
Qty: 22 | Refills: 0 | Status: ACTIVE | COMMUNITY
Start: 2018-03-12

## 2021-06-01 RX ORDER — PRAMIPEXOLE DIHYDROCHLORIDE 0.5 MG/1
1 TABLET TABLET ORAL ONCE A DAY
Status: ACTIVE | COMMUNITY

## 2021-06-01 RX ORDER — CETIRIZINE HYDROCHLORIDE 10 MG/1
TABLET, FILM COATED ORAL
Qty: 90 | Refills: 0 | Status: ON HOLD | COMMUNITY
Start: 2019-01-17

## 2021-06-01 RX ORDER — SODIUM, POTASSIUM,MAG SULFATES 17.5-3.13G
354 ML SOLUTION, RECONSTITUTED, ORAL ORAL ONCE
Qty: 354 MILLILITER | Refills: 0 | OUTPATIENT
Start: 2021-06-01 | End: 2021-06-02

## 2021-06-01 RX ORDER — POLYETHYLENE GLYCOL 3350, SODIUM CHLORIDE, SODIUM BICARBONATE AND POTASSIUM CHLORIDE WITH LEMON FLAVOR 420; 11.2; 5.72; 1.48 G/4L; G/4L; G/4L; G/4L
MIX CONTENTS DAY PRIOR TO PROCEDURE, BEGIN DRINKING SOLUTION AT 5PM UNTIL HALF GONE. COMPLETE SOLUTION 6 HOURS PRIOR TO PROCEDURE POWDER, FOR SOLUTION ORAL
Qty: 1 | Refills: 0 | Status: ON HOLD | COMMUNITY
Start: 2019-01-18

## 2021-06-01 RX ORDER — METOPROLOL TARTRATE 50 MG/1
TAKE 1 TABLET DAILY TABLET, FILM COATED ORAL
Refills: 0 | Status: ON HOLD | COMMUNITY

## 2021-06-01 RX ORDER — DUTASTERIDE 0.5 MG
TAKE 1 CAPSULE DAILY CAPSULE ORAL
Refills: 0 | Status: ON HOLD | COMMUNITY

## 2021-06-01 RX ORDER — DICYCLOMINE HYDROCHLORIDE 10 MG/1
1 CAPSULE CAPSULE ORAL
Qty: 60 | Refills: 1 | Status: ACTIVE | COMMUNITY
Start: 2021-04-20 | End: 2021-06-19

## 2021-06-01 RX ORDER — HYDROCHLOROTHIAZIDE 25 MG/1
TAKE 1 TABLET DAILY TABLET ORAL
Refills: 0 | Status: ACTIVE | COMMUNITY

## 2021-06-01 RX ORDER — TADALAFIL 20 MG/1
1 TABLET TABLET, COATED ORAL ONCE A DAY
Status: ACTIVE | COMMUNITY

## 2021-06-01 RX ORDER — ALBUTEROL SULFATE 90 UG/1
AEROSOL, METERED RESPIRATORY (INHALATION)
Qty: 25 | Refills: 0 | Status: ON HOLD | COMMUNITY
Start: 2018-03-12

## 2021-06-01 RX ORDER — LEVOTHYROXINE SODIUM 0.17 MG/1
TAKE 1 TABLET DAILY TABLET ORAL
Refills: 0 | Status: ON HOLD | COMMUNITY

## 2021-06-01 RX ORDER — OXYBUTYNIN 5 MG/1
TABLET, FILM COATED, EXTENDED RELEASE ORAL
Qty: 180 | Refills: 0 | Status: ON HOLD | COMMUNITY
Start: 2019-03-20

## 2021-06-01 RX ORDER — ACYCLOVIR 50 MG/G
OINTMENT TOPICAL
Qty: 15 | Refills: 0 | Status: ACTIVE | COMMUNITY
Start: 2019-09-25

## 2021-06-01 RX ORDER — SIMVASTATIN 40 MG/1
TAKE 1 TABLET DAILY TABLET, FILM COATED ORAL
Refills: 0 | Status: ON HOLD | COMMUNITY

## 2021-06-01 RX ORDER — DUTASTERIDE 0.5 MG/1
1 CAPSULE CAPSULE, LIQUID FILLED ORAL ONCE A DAY
Status: ACTIVE | COMMUNITY

## 2021-06-01 RX ORDER — HYDROCODONE BITARTRATE AND ACETAMINOPHEN 5; 325 MG/1; MG/1
TAKE 1 TABLET EVERY 4 TO 6 HOURS AS NEEDED FOR PAIN TABLET ORAL
Refills: 0 | Status: ON HOLD | COMMUNITY

## 2021-06-01 RX ORDER — MONTELUKAST SODIUM 10 MG/1
1 TABLET TABLET, FILM COATED ORAL ONCE A DAY
Status: ACTIVE | COMMUNITY

## 2021-06-01 NOTE — HPI-OTHER HISTORIES
EGD 9/20/19 revealed an irregular Z line, [place]-colored mucosa from 39-40 cm, gastroesophageal flap valve classified as Hill grade II, small hiatal hernia, diffuse moderate gastritis and normal duodenum. GE junction biopsies revealed reflux type changes, negative for Petit's esophagus, gastric biopsies revealed multifocal atrophic gastritis with extensive complete intestinal metaplasia, negative for H. pylori or malignancy.  Colonoscopy (8/5/14) by Dr. Tapia revealed multiple diverticula in the entire colon and otherwise normal colon.

## 2021-06-01 NOTE — HPI-TODAY'S VISIT:
75-year-old male with a history of hypertension, stage III chronic kidney disease, sleep apnea, thyroid cancer, prostate cancer, GERD, presenting for follow-up after EGD. He was last seen 4/20/2021 for evaluation of a recent exacerbation of abdominal pain and red blood per rectum, with persistent symptoms despite a recent course of Cipro and Flagyl for empiric treatment of diverticulitis.  He was recommended a daily bowel regimen with MiraLAX and dicyclomine was provided to use as needed for symptomatic relief. A CT of the abdomen and pelvis was planned. Labs 4/20/2021:H/H 10.9/33.9, MCV 92.9, , WBC 2.6.  CMP unremarkable aside from BUN/creat 19/1.4.  He was encouraged to follow-up with hem/onc, Dr. Peters. CT of the abdomen and pelvis with contrast 4/28/2021:Mild wall thickening involving the pyloric region of the stomach. Subsequent EGD on 5/19/2021 revealed an irregular Z-line at 40 cm, esophageal mucosal changes suspicious for short segment Petit's esophagus, gastroesophageal flap valve classified as Hill grade II (fold present, opens with respiration), gastric mucosal atrophy, and atrophic nonerosive gastritis. GEJ biopsy showed squamocolumnar mucosa without abnormality, negative for Petit's.  Gastric biopsy showed multifocal atrophic gastritis with focal intestinal metaplasia arising in a background of chronic inactive gastritis with changes suggestive of past H. pylori gastritis.  No H. pylori organisms were identified. He has occasional mid abdominal pain following meals, but this has lessened. His reflux is managed with pantoprazole and he denies nausea or vomiting. His bowel habits are regular and he denies further red blood per rectum. He has not followed up with hem/onc.

## 2021-08-16 ENCOUNTER — OFFICE VISIT (OUTPATIENT)
Dept: URBAN - METROPOLITAN AREA SURGERY CENTER 25 | Facility: SURGERY CENTER | Age: 76
End: 2021-08-16
Payer: MEDICARE

## 2021-08-16 DIAGNOSIS — K62.5 ANAL BLEEDING: ICD-10-CM

## 2021-08-16 DIAGNOSIS — K55.20 ANGIODYSPLASIA: ICD-10-CM

## 2021-08-16 PROCEDURE — G8907 PT DOC NO EVENTS ON DISCHARG: HCPCS | Performed by: INTERNAL MEDICINE

## 2021-08-16 PROCEDURE — 45378 DIAGNOSTIC COLONOSCOPY: CPT | Performed by: INTERNAL MEDICINE

## 2021-08-16 RX ORDER — ATORVASTATIN CALCIUM 20 MG/1
TAKE 1 TABLET DAILY TABLET, FILM COATED ORAL
Refills: 0 | Status: ACTIVE | COMMUNITY

## 2021-08-16 RX ORDER — METHOCARBAMOL 500 MG/1
TAKE 1 TABLET AS NEEDED TABLET, FILM COATED ORAL
Refills: 0 | Status: ON HOLD | COMMUNITY

## 2021-08-16 RX ORDER — SERTRALINE HCL 100 MG
TAKE 1 TABLET DAILY AS DIRECTED TABLET ORAL
Refills: 0 | Status: ON HOLD | COMMUNITY

## 2021-08-16 RX ORDER — DOCUSATE SODIUM 100 MG/1
CAPSULE, LIQUID FILLED ORAL
Qty: 90 | Refills: 0 | Status: ON HOLD | COMMUNITY
Start: 2018-03-12

## 2021-08-16 RX ORDER — CETIRIZINE HYDROCHLORIDE 10 MG/1
TABLET, FILM COATED ORAL
Qty: 90 | Refills: 0 | Status: ON HOLD | COMMUNITY
Start: 2019-01-17

## 2021-08-16 RX ORDER — FLUTICASONE PROPIONATE 44 UG/1
AEROSOL, METERED RESPIRATORY (INHALATION)
Qty: 10 | Refills: 0 | Status: ON HOLD | COMMUNITY
Start: 2019-01-24

## 2021-08-16 RX ORDER — ALBUTEROL SULFATE 90 UG/1
AEROSOL, METERED RESPIRATORY (INHALATION)
Qty: 25 | Refills: 0 | Status: ON HOLD | COMMUNITY
Start: 2018-03-12

## 2021-08-16 RX ORDER — PANTOPRAZOLE SODIUM 40 MG/1
TAKE 1 TABLET DAILY TABLET, DELAYED RELEASE ORAL
Qty: 5 | Refills: 2 | Status: ACTIVE | COMMUNITY
Start: 2018-03-12

## 2021-08-16 RX ORDER — ALFUZOSIN HCL 10 MG
TAKE 1 TABLET DAILY TABLET, EXTENDED RELEASE 24 HR ORAL
Refills: 0 | Status: ON HOLD | COMMUNITY

## 2021-08-16 RX ORDER — HYDROCODONE BITARTRATE AND ACETAMINOPHEN 10; 325 MG/1; MG/1
TABLET ORAL
Qty: 90 | Refills: 0 | Status: ON HOLD | COMMUNITY
Start: 2018-03-12

## 2021-08-16 RX ORDER — LEVOTHYROXINE SODIUM 0.17 MG/1
TAKE 1 TABLET DAILY TABLET ORAL
Refills: 0 | Status: ON HOLD | COMMUNITY

## 2021-08-16 RX ORDER — POLYETHYLENE GLYCOL 3350, SODIUM CHLORIDE, SODIUM BICARBONATE AND POTASSIUM CHLORIDE WITH LEMON FLAVOR 420; 11.2; 5.72; 1.48 G/4L; G/4L; G/4L; G/4L
MIX CONTENTS DAY PRIOR TO PROCEDURE, BEGIN DRINKING SOLUTION AT 5PM UNTIL HALF GONE. COMPLETE SOLUTION 6 HOURS PRIOR TO PROCEDURE POWDER, FOR SOLUTION ORAL
Qty: 1 | Refills: 0 | Status: ON HOLD | COMMUNITY
Start: 2019-01-18

## 2021-08-16 RX ORDER — PHENAZOPYRIDINE HYDROCHLORIDE 100 MG/1
TABLET ORAL
Qty: 120 | Refills: 0 | Status: ON HOLD | COMMUNITY
Start: 2018-01-09

## 2021-08-16 RX ORDER — SILDENAFIL CITRATE 20 MG/1
TAKE 1 TABLET DAILY. PT STATES UNKNOWN DOSAGE TABLET ORAL
Refills: 0 | Status: ON HOLD | COMMUNITY

## 2021-08-16 RX ORDER — FAMOTIDINE 40 MG/1
TAKE 1 TABLET AT BEDTIME TABLET ORAL
Qty: 90 | Refills: 3 | Status: ON HOLD | COMMUNITY

## 2021-08-16 RX ORDER — PRAMIPEXOLE DIHYDROCHLORIDE 0.5 MG/1
TAKE 1 TABLET AT BEDTIME TABLET ORAL
Refills: 0 | Status: ON HOLD | COMMUNITY

## 2021-08-16 RX ORDER — TADALAFIL 20 MG/1
TAKE 1 TABLET DAILY 1 HOUR BEFORE NEEDED TABLET, FILM COATED ORAL
Refills: 0 | Status: ON HOLD | COMMUNITY
Start: 2018-06-14

## 2021-08-16 RX ORDER — MUPIROCIN 20 MG/G
OINTMENT TOPICAL
Qty: 22 | Refills: 0 | Status: ACTIVE | COMMUNITY
Start: 2018-03-12

## 2021-08-16 RX ORDER — LISINOPRIL 40 MG/1
TAKE 1 TABLET DAILY TABLET ORAL
Refills: 0 | Status: ACTIVE | COMMUNITY

## 2021-08-16 RX ORDER — PRAMIPEXOLE DIHYDROCHLORIDE 0.5 MG/1
1 TABLET TABLET ORAL ONCE A DAY
Status: ACTIVE | COMMUNITY

## 2021-08-16 RX ORDER — AMOXICILLIN 500 MG/1
CAPSULE ORAL
Qty: 24 | Refills: 0 | Status: ON HOLD | COMMUNITY
Start: 2017-08-14

## 2021-08-16 RX ORDER — OXYBUTYNIN 5 MG/1
TABLET, FILM COATED, EXTENDED RELEASE ORAL
Qty: 180 | Refills: 0 | Status: ON HOLD | COMMUNITY
Start: 2019-03-20

## 2021-08-16 RX ORDER — WHEAT DEXTRIN 3 G/4 G
TAKE 2 TSP DAILY. AS NEEDED POWDER (GRAM) ORAL
Refills: 0 | Status: ON HOLD | COMMUNITY

## 2021-08-16 RX ORDER — METHOCARBAMOL 500 MG/1
1 TABLET TABLET ORAL ONCE DAILY
Status: ACTIVE | COMMUNITY

## 2021-08-16 RX ORDER — FINASTERIDE 5 MG/1
TABLET, FILM COATED ORAL
Qty: 90 | Refills: 0 | Status: ACTIVE | COMMUNITY
Start: 2017-11-29

## 2021-08-16 RX ORDER — ONDANSETRON 8 MG/1
TAKE 1 TABLET BEFORE MEALS TABLET ORAL
Qty: 90 | Refills: 2 | Status: ACTIVE | COMMUNITY

## 2021-08-16 RX ORDER — FLUTICASONE PROPIONATE 50 UG/1
SPRAY, METERED NASAL
Qty: 16 | Refills: 0 | Status: ACTIVE | COMMUNITY
Start: 2019-10-29

## 2021-08-16 RX ORDER — MONTELUKAST SODIUM 10 MG/1
1 TABLET TABLET, FILM COATED ORAL ONCE A DAY
Status: ACTIVE | COMMUNITY

## 2021-08-16 RX ORDER — ACYCLOVIR 50 MG/G
OINTMENT TOPICAL
Qty: 15 | Refills: 0 | Status: ACTIVE | COMMUNITY
Start: 2019-09-25

## 2021-08-16 RX ORDER — DUTASTERIDE 0.5 MG/1
1 CAPSULE CAPSULE, LIQUID FILLED ORAL ONCE A DAY
Status: ACTIVE | COMMUNITY

## 2021-08-16 RX ORDER — DUTASTERIDE 0.5 MG
TAKE 1 CAPSULE DAILY CAPSULE ORAL
Refills: 0 | Status: ON HOLD | COMMUNITY

## 2021-08-16 RX ORDER — AMLODIPINE BESYLATE 10 MG/1
TAKE 1 TABLET DAILY TABLET ORAL
Refills: 0 | Status: ACTIVE | COMMUNITY

## 2021-08-16 RX ORDER — TADALAFIL 10 MG
TABLET ORAL
Qty: 15 | Refills: 0 | Status: ON HOLD | COMMUNITY
Start: 2018-02-14

## 2021-08-16 RX ORDER — DOCUSATE SODIUM 100 MG
TAKE 1 TABLET DAILY AS DIRECTED TABLET ORAL
Refills: 0 | Status: ACTIVE | COMMUNITY

## 2021-08-16 RX ORDER — SIMVASTATIN 40 MG/1
TAKE 1 TABLET DAILY TABLET, FILM COATED ORAL
Refills: 0 | Status: ON HOLD | COMMUNITY

## 2021-08-16 RX ORDER — HYDROCHLOROTHIAZIDE 25 MG/1
TAKE 1 TABLET DAILY TABLET ORAL
Refills: 0 | Status: ACTIVE | COMMUNITY

## 2021-08-16 RX ORDER — BENZOCAINE 20 G/100G
GEL, DENTIFRICE ORAL
Qty: 90 | Refills: 0 | Status: ON HOLD | COMMUNITY
Start: 2018-03-12

## 2021-08-16 RX ORDER — LEVOTHYROXINE SODIUM 0.15 MG/1
TABLET ORAL
Qty: 90 | Refills: 0 | Status: ACTIVE | COMMUNITY
Start: 2018-09-14

## 2021-08-16 RX ORDER — GABAPENTIN 300 MG/1
TAKE 1 CAPSULE DAILY CAPSULE ORAL
Refills: 0 | Status: ACTIVE | COMMUNITY

## 2021-08-16 RX ORDER — METOPROLOL TARTRATE 50 MG/1
TAKE 1 TABLET DAILY TABLET, FILM COATED ORAL
Refills: 0 | Status: ON HOLD | COMMUNITY

## 2021-08-16 RX ORDER — MELOXICAM 15 MG/1
TAKE 1 TABLET DAILY TABLET ORAL
Refills: 0 | Status: ON HOLD | COMMUNITY

## 2021-08-16 RX ORDER — HYDROCODONE BITARTRATE AND ACETAMINOPHEN 5; 325 MG/1; MG/1
TAKE 1 TABLET EVERY 4 TO 6 HOURS AS NEEDED FOR PAIN TABLET ORAL
Refills: 0 | Status: ON HOLD | COMMUNITY

## 2021-08-16 RX ORDER — TRAZODONE HYDROCHLORIDE 100 MG/1
2 TABLET AT BEDTIME TABLET, FILM COATED ORAL ONCE A DAY
Status: ACTIVE | COMMUNITY

## 2021-08-16 RX ORDER — TADALAFIL 20 MG/1
1 TABLET TABLET, COATED ORAL ONCE A DAY
Status: ACTIVE | COMMUNITY

## 2021-08-16 RX ORDER — SERTRALINE 100 MG/1
1 TABLET TABLET, FILM COATED ORAL ONCE A DAY
Status: ACTIVE | COMMUNITY

## 2021-09-27 ENCOUNTER — OFFICE VISIT (OUTPATIENT)
Dept: URBAN - METROPOLITAN AREA CLINIC 113 | Facility: CLINIC | Age: 76
End: 2021-09-27
Payer: MEDICARE

## 2021-09-27 VITALS
DIASTOLIC BLOOD PRESSURE: 75 MMHG | BODY MASS INDEX: 22.58 KG/M2 | SYSTOLIC BLOOD PRESSURE: 132 MMHG | HEART RATE: 99 BPM | TEMPERATURE: 98 F | WEIGHT: 149 LBS | HEIGHT: 68 IN

## 2021-09-27 DIAGNOSIS — K21.9 GERD: ICD-10-CM

## 2021-09-27 DIAGNOSIS — D64.9 NORMOCYTIC ANEMIA: ICD-10-CM

## 2021-09-27 DIAGNOSIS — K62.5 RECTAL BLEEDING: ICD-10-CM

## 2021-09-27 DIAGNOSIS — K57.90 DIVERTICULOSIS: ICD-10-CM

## 2021-09-27 DIAGNOSIS — D61.818 PANCYTOPENIA: ICD-10-CM

## 2021-09-27 DIAGNOSIS — K55.20 ANGIODYSPLASIA OF COLON: ICD-10-CM

## 2021-09-27 PROBLEM — 197244008: Status: ACTIVE | Noted: 2021-09-27

## 2021-09-27 PROBLEM — 235595009 GASTROESOPHAGEAL REFLUX DISEASE: Status: ACTIVE | Noted: 2021-06-01

## 2021-09-27 PROBLEM — 397881000: Status: ACTIVE | Noted: 2021-09-27

## 2021-09-27 PROBLEM — 127034005: Status: ACTIVE | Noted: 2021-04-22

## 2021-09-27 PROBLEM — 300980002 NORMOCYTIC ANEMIA: Status: ACTIVE | Noted: 2021-09-27

## 2021-09-27 PROBLEM — 12063002: Status: ACTIVE | Noted: 2021-09-27

## 2021-09-27 PROCEDURE — 99214 OFFICE O/P EST MOD 30 MIN: CPT | Performed by: INTERNAL MEDICINE

## 2021-09-27 RX ORDER — PANTOPRAZOLE SODIUM 40 MG/1
TAKE 1 TABLET DAILY TABLET, DELAYED RELEASE ORAL
OUTPATIENT
Start: 2018-03-12

## 2021-09-27 RX ORDER — DOCUSATE SODIUM 100 MG
TAKE 1 TABLET DAILY AS DIRECTED TABLET ORAL
Refills: 0 | Status: ACTIVE | COMMUNITY

## 2021-09-27 RX ORDER — CETIRIZINE HYDROCHLORIDE 10 MG/1
TABLET, FILM COATED ORAL
Qty: 90 | Refills: 0 | Status: ON HOLD | COMMUNITY
Start: 2019-01-17

## 2021-09-27 RX ORDER — MUPIROCIN 20 MG/G
OINTMENT TOPICAL
Qty: 22 | Refills: 0 | Status: ACTIVE | COMMUNITY
Start: 2018-03-12

## 2021-09-27 RX ORDER — SILDENAFIL CITRATE 20 MG/1
TAKE 1 TABLET DAILY. PT STATES UNKNOWN DOSAGE TABLET ORAL
Refills: 0 | Status: ON HOLD | COMMUNITY

## 2021-09-27 RX ORDER — ATORVASTATIN CALCIUM 20 MG/1
TAKE 1 TABLET DAILY TABLET, FILM COATED ORAL
Refills: 0 | Status: ACTIVE | COMMUNITY

## 2021-09-27 RX ORDER — METHOCARBAMOL 500 MG/1
TAKE 1 TABLET AS NEEDED TABLET, FILM COATED ORAL
Refills: 0 | Status: ON HOLD | COMMUNITY

## 2021-09-27 RX ORDER — ACYCLOVIR 50 MG/G
OINTMENT TOPICAL
Qty: 15 | Refills: 0 | Status: ACTIVE | COMMUNITY
Start: 2019-09-25

## 2021-09-27 RX ORDER — HYDROCODONE BITARTRATE AND ACETAMINOPHEN 5; 325 MG/1; MG/1
TAKE 1 TABLET EVERY 4 TO 6 HOURS AS NEEDED FOR PAIN TABLET ORAL
Refills: 0 | Status: ON HOLD | COMMUNITY

## 2021-09-27 RX ORDER — LISINOPRIL 40 MG/1
TAKE 1 TABLET DAILY TABLET ORAL
Refills: 0 | Status: ACTIVE | COMMUNITY

## 2021-09-27 RX ORDER — AMOXICILLIN 500 MG/1
CAPSULE ORAL
Qty: 24 | Refills: 0 | Status: ON HOLD | COMMUNITY
Start: 2017-08-14

## 2021-09-27 RX ORDER — TADALAFIL 10 MG
TABLET ORAL
Qty: 15 | Refills: 0 | Status: ON HOLD | COMMUNITY
Start: 2018-02-14

## 2021-09-27 RX ORDER — PHENAZOPYRIDINE HYDROCHLORIDE 100 MG/1
TABLET ORAL
Qty: 120 | Refills: 0 | Status: ON HOLD | COMMUNITY
Start: 2018-01-09

## 2021-09-27 RX ORDER — METOPROLOL TARTRATE 50 MG/1
TAKE 1 TABLET DAILY TABLET, FILM COATED ORAL
Refills: 0 | Status: ON HOLD | COMMUNITY

## 2021-09-27 RX ORDER — LEVOTHYROXINE SODIUM 0.17 MG/1
TAKE 1 TABLET DAILY TABLET ORAL
Refills: 0 | Status: ON HOLD | COMMUNITY

## 2021-09-27 RX ORDER — PRAMIPEXOLE DIHYDROCHLORIDE 0.5 MG/1
1 TABLET TABLET ORAL ONCE A DAY
Status: ACTIVE | COMMUNITY

## 2021-09-27 RX ORDER — HYDROCHLOROTHIAZIDE 25 MG/1
TAKE 1 TABLET DAILY TABLET ORAL
Refills: 0 | Status: ACTIVE | COMMUNITY

## 2021-09-27 RX ORDER — ALFUZOSIN HCL 10 MG
TAKE 1 TABLET DAILY TABLET, EXTENDED RELEASE 24 HR ORAL
Refills: 0 | Status: ON HOLD | COMMUNITY

## 2021-09-27 RX ORDER — TRAZODONE HYDROCHLORIDE 100 MG/1
2 TABLET AT BEDTIME TABLET, FILM COATED ORAL ONCE A DAY
Status: ACTIVE | COMMUNITY

## 2021-09-27 RX ORDER — FINASTERIDE 5 MG/1
TABLET, FILM COATED ORAL
Qty: 90 | Refills: 0 | Status: ACTIVE | COMMUNITY
Start: 2017-11-29

## 2021-09-27 RX ORDER — FLUTICASONE PROPIONATE 44 UG/1
AEROSOL, METERED RESPIRATORY (INHALATION)
Qty: 10 | Refills: 0 | Status: ON HOLD | COMMUNITY
Start: 2019-01-24

## 2021-09-27 RX ORDER — TADALAFIL 20 MG/1
1 TABLET TABLET, COATED ORAL ONCE A DAY
Status: ACTIVE | COMMUNITY

## 2021-09-27 RX ORDER — GABAPENTIN 300 MG/1
TAKE 1 CAPSULE DAILY CAPSULE ORAL
Refills: 0 | Status: ACTIVE | COMMUNITY

## 2021-09-27 RX ORDER — DOCUSATE SODIUM 100 MG/1
CAPSULE, LIQUID FILLED ORAL
Qty: 90 | Refills: 0 | Status: ON HOLD | COMMUNITY
Start: 2018-03-12

## 2021-09-27 RX ORDER — HYDROCODONE BITARTRATE AND ACETAMINOPHEN 10; 325 MG/1; MG/1
TABLET ORAL
Qty: 90 | Refills: 0 | Status: ON HOLD | COMMUNITY
Start: 2018-03-12

## 2021-09-27 RX ORDER — TADALAFIL 20 MG/1
TAKE 1 TABLET DAILY 1 HOUR BEFORE NEEDED TABLET, FILM COATED ORAL
Refills: 0 | Status: ON HOLD | COMMUNITY
Start: 2018-06-14

## 2021-09-27 RX ORDER — LEVOTHYROXINE SODIUM 0.15 MG/1
TABLET ORAL
Qty: 90 | Refills: 0 | Status: ACTIVE | COMMUNITY
Start: 2018-09-14

## 2021-09-27 RX ORDER — MONTELUKAST SODIUM 10 MG/1
1 TABLET TABLET, FILM COATED ORAL ONCE A DAY
Status: ACTIVE | COMMUNITY

## 2021-09-27 RX ORDER — SERTRALINE HCL 100 MG
TAKE 1 TABLET DAILY AS DIRECTED TABLET ORAL
Refills: 0 | Status: ON HOLD | COMMUNITY

## 2021-09-27 RX ORDER — ALBUTEROL SULFATE 90 UG/1
AEROSOL, METERED RESPIRATORY (INHALATION)
Qty: 25 | Refills: 0 | Status: ON HOLD | COMMUNITY
Start: 2018-03-12

## 2021-09-27 RX ORDER — FAMOTIDINE 40 MG/1
TAKE 1 TABLET AT BEDTIME TABLET ORAL
Qty: 90 | Refills: 3 | Status: ON HOLD | COMMUNITY

## 2021-09-27 RX ORDER — POLYETHYLENE GLYCOL 3350, SODIUM CHLORIDE, SODIUM BICARBONATE AND POTASSIUM CHLORIDE WITH LEMON FLAVOR 420; 11.2; 5.72; 1.48 G/4L; G/4L; G/4L; G/4L
MIX CONTENTS DAY PRIOR TO PROCEDURE, BEGIN DRINKING SOLUTION AT 5PM UNTIL HALF GONE. COMPLETE SOLUTION 6 HOURS PRIOR TO PROCEDURE POWDER, FOR SOLUTION ORAL
Qty: 1 | Refills: 0 | Status: ON HOLD | COMMUNITY
Start: 2019-01-18

## 2021-09-27 RX ORDER — SERTRALINE 100 MG/1
1 TABLET TABLET, FILM COATED ORAL ONCE A DAY
Status: ACTIVE | COMMUNITY

## 2021-09-27 RX ORDER — DUTASTERIDE 0.5 MG
TAKE 1 CAPSULE DAILY CAPSULE ORAL
Refills: 0 | Status: ON HOLD | COMMUNITY

## 2021-09-27 RX ORDER — AMLODIPINE BESYLATE 10 MG/1
TAKE 1 TABLET DAILY TABLET ORAL
Refills: 0 | Status: ACTIVE | COMMUNITY

## 2021-09-27 RX ORDER — DUTASTERIDE 0.5 MG/1
1 CAPSULE CAPSULE, LIQUID FILLED ORAL ONCE A DAY
Status: ACTIVE | COMMUNITY

## 2021-09-27 RX ORDER — BENZOCAINE 20 G/100G
GEL, DENTIFRICE ORAL
Qty: 90 | Refills: 0 | Status: ON HOLD | COMMUNITY
Start: 2018-03-12

## 2021-09-27 RX ORDER — WHEAT DEXTRIN 3 G/4 G
TAKE 2 TSP DAILY. AS NEEDED POWDER (GRAM) ORAL
Refills: 0 | Status: ON HOLD | COMMUNITY

## 2021-09-27 RX ORDER — PRAMIPEXOLE DIHYDROCHLORIDE 0.5 MG/1
TAKE 1 TABLET AT BEDTIME TABLET ORAL
Refills: 0 | Status: ON HOLD | COMMUNITY

## 2021-09-27 RX ORDER — SIMVASTATIN 40 MG/1
TAKE 1 TABLET DAILY TABLET, FILM COATED ORAL
Refills: 0 | Status: ON HOLD | COMMUNITY

## 2021-09-27 RX ORDER — PANTOPRAZOLE SODIUM 40 MG/1
TAKE 1 TABLET DAILY TABLET, DELAYED RELEASE ORAL
Qty: 5 | Refills: 2 | Status: ACTIVE | COMMUNITY
Start: 2018-03-12

## 2021-09-27 RX ORDER — MELOXICAM 15 MG/1
TAKE 1 TABLET DAILY TABLET ORAL
Refills: 0 | Status: ON HOLD | COMMUNITY

## 2021-09-27 RX ORDER — METHOCARBAMOL 500 MG/1
1 TABLET TABLET ORAL ONCE DAILY
Status: ACTIVE | COMMUNITY

## 2021-09-27 RX ORDER — FLUTICASONE PROPIONATE 50 UG/1
SPRAY, METERED NASAL
Qty: 16 | Refills: 0 | Status: ACTIVE | COMMUNITY
Start: 2019-10-29

## 2021-09-27 RX ORDER — OXYBUTYNIN 5 MG/1
TABLET, FILM COATED, EXTENDED RELEASE ORAL
Qty: 180 | Refills: 0 | Status: ON HOLD | COMMUNITY
Start: 2019-03-20

## 2021-09-27 RX ORDER — ONDANSETRON 8 MG/1
TAKE 1 TABLET BEFORE MEALS TABLET ORAL
Qty: 90 | Refills: 2 | Status: ACTIVE | COMMUNITY

## 2021-09-27 NOTE — HPI-TODAY'S VISIT:
Mr. Persaud is a 75-year-old male with a history of hypertension, stage III chronic kidney disease, sleep apnea, thyroid cancer, prostate cancer, GERD, presenting for follow-up after colonoscopy performed 8/16/21.   Colonoscopy revealed a normal terminal ileum, many diverticula in the sigmoid, descending, transverse and ascending colon, single small angiectasia in the transverse colon, otherwise normal colon and small internal hemorrhoids.  No further colon cancer screening was recommended given his advanced age.  Overall he denies any new GI complaints.  He has not started any fiber supplementation.  He denies GERD, nausea, vomiting, abdominal pain, change in bowel habits or blood in stool.  He does have occasional rectal bleeding attributed to the hemorrhoids noted on recent colonoscopy.    Subsequent EGD on 5/19/2021 revealed an irregular Z-line at 40 cm, esophageal mucosal changes suspicious for short segment Petit's esophagus, gastroesophageal flap valve classified as Hill grade II (fold present, opens with respiration), gastric mucosal atrophy, and atrophic nonerosive gastritis. GEJ biopsy showed squamocolumnar mucosa without abnormality, negative for Petit's.  Gastric biopsy showed multifocal atrophic gastritis with focal intestinal metaplasia arising in a background of chronic inactive gastritis with changes suggestive of past H. pylori gastritis.  No H. pylori organisms were identified.   Labs 4/20/2021:H/H 10.9/33.9, MCV 92.9, , WBC 2.6.  CMP unremarkable aside from BUN/cr 19/1.4.  He was encouraged to follow-up with hem/onc, Dr. Peters. CT of the abdomen and pelvis with contrast 4/28/2021:Mild wall thickening involving the pyloric region of the stomach.

## 2021-09-27 NOTE — HPI-OTHER HISTORIES
EGD 9/20/19 revealed an irregular Z line, [place]-colored mucosa from 39-40 cm, gastroesophageal flap valve classified as Hill grade II, small hiatal hernia, diffuse moderate gastritis and normal duodenum. GE junction biopsies revealed reflux type changes, negative for Petit's esophagus, gastric biopsies revealed multifocal atrophic gastritis with extensive complete intestinal metaplasia, negative for H. pylori or malignancy.  Colonoscopy (8/5/14) by Dr. Tapia revealed multiple diverticula in the entire colon and otherwise normal colon.

## 2022-02-07 ENCOUNTER — ERX REFILL RESPONSE (OUTPATIENT)
Dept: URBAN - METROPOLITAN AREA CLINIC 113 | Facility: CLINIC | Age: 77
End: 2022-02-07

## 2022-02-07 RX ORDER — FAMOTIDINE 40 MG/1
TAKE 1 TABLET AT BEDTIME (NEED APPOINTMENT) TABLET ORAL
Qty: 90 TABLET | Refills: 4 | OUTPATIENT

## 2023-02-20 ENCOUNTER — ERX REFILL RESPONSE (OUTPATIENT)
Dept: URBAN - METROPOLITAN AREA CLINIC 113 | Facility: CLINIC | Age: 78
End: 2023-02-20

## 2023-02-20 RX ORDER — FAMOTIDINE 40 MG/1
TAKE 1 TABLET AT BEDTIME (NEED APPOINTMENT) TABLET ORAL
Qty: 90 TABLET | Refills: 4 | OUTPATIENT

## 2023-02-20 RX ORDER — FAMOTIDINE 40 MG/1
TAKE 1 TABLET AT BEDTIME (NEED APPOINTMENT) TABLET ORAL
Qty: 90 TABLET | Refills: 3 | OUTPATIENT

## 2023-03-24 ENCOUNTER — OFFICE VISIT (OUTPATIENT)
Dept: URBAN - METROPOLITAN AREA CLINIC 113 | Facility: CLINIC | Age: 78
End: 2023-03-24
Payer: MEDICARE

## 2023-03-24 VITALS
TEMPERATURE: 97.3 F | HEART RATE: 98 BPM | WEIGHT: 152 LBS | RESPIRATION RATE: 16 BRPM | HEIGHT: 68 IN | DIASTOLIC BLOOD PRESSURE: 61 MMHG | BODY MASS INDEX: 23.04 KG/M2 | SYSTOLIC BLOOD PRESSURE: 141 MMHG

## 2023-03-24 DIAGNOSIS — K55.20 ANGIODYSPLASIA OF COLON: ICD-10-CM

## 2023-03-24 DIAGNOSIS — K57.90 DIVERTICULOSIS: ICD-10-CM

## 2023-03-24 DIAGNOSIS — D61.818 PANCYTOPENIA: ICD-10-CM

## 2023-03-24 DIAGNOSIS — K21.9 GERD: ICD-10-CM

## 2023-03-24 DIAGNOSIS — K62.5 RECTAL BLEEDING: ICD-10-CM

## 2023-03-24 DIAGNOSIS — D64.9 NORMOCYTIC ANEMIA: ICD-10-CM

## 2023-03-24 PROCEDURE — 99214 OFFICE O/P EST MOD 30 MIN: CPT | Performed by: INTERNAL MEDICINE

## 2023-03-24 RX ORDER — HYDROCHLOROTHIAZIDE 25 MG/1
TAKE 1 TABLET DAILY TABLET ORAL
Refills: 0 | Status: ACTIVE | COMMUNITY

## 2023-03-24 RX ORDER — TRAZODONE HYDROCHLORIDE 100 MG/1
2 TABLET AT BEDTIME TABLET, FILM COATED ORAL ONCE A DAY
Status: ACTIVE | COMMUNITY

## 2023-03-24 RX ORDER — ACYCLOVIR 50 MG/G
OINTMENT TOPICAL
Qty: 15 | Refills: 0 | Status: ACTIVE | COMMUNITY
Start: 2019-09-25

## 2023-03-24 RX ORDER — METHOCARBAMOL 500 MG/1
1 TABLET TABLET ORAL ONCE DAILY
Status: ACTIVE | COMMUNITY

## 2023-03-24 RX ORDER — DUTASTERIDE 0.5 MG
TAKE 1 CAPSULE DAILY CAPSULE ORAL
Refills: 0 | Status: ON HOLD | COMMUNITY

## 2023-03-24 RX ORDER — DOCUSATE SODIUM 100 MG
TAKE 1 TABLET DAILY AS DIRECTED TABLET ORAL
Refills: 0 | Status: ACTIVE | COMMUNITY

## 2023-03-24 RX ORDER — WHEAT DEXTRIN 3 G/4 G
TAKE 2 TSP DAILY. AS NEEDED POWDER (GRAM) ORAL
Refills: 0 | Status: ON HOLD | COMMUNITY

## 2023-03-24 RX ORDER — PRAMIPEXOLE DIHYDROCHLORIDE 0.5 MG/1
TAKE 1 TABLET AT BEDTIME TABLET ORAL
Refills: 0 | Status: ON HOLD | COMMUNITY

## 2023-03-24 RX ORDER — DOCUSATE SODIUM 100 MG/1
CAPSULE, LIQUID FILLED ORAL
Qty: 90 | Refills: 0 | Status: ON HOLD | COMMUNITY
Start: 2018-03-12

## 2023-03-24 RX ORDER — DUTASTERIDE 0.5 MG/1
1 CAPSULE CAPSULE, LIQUID FILLED ORAL ONCE A DAY
Status: ACTIVE | COMMUNITY

## 2023-03-24 RX ORDER — LEVOTHYROXINE SODIUM 0.15 MG/1
TABLET ORAL
Qty: 90 | Refills: 0 | Status: ACTIVE | COMMUNITY
Start: 2018-09-14

## 2023-03-24 RX ORDER — PRAMIPEXOLE DIHYDROCHLORIDE 0.5 MG/1
1 TABLET TABLET ORAL ONCE A DAY
Status: ACTIVE | COMMUNITY

## 2023-03-24 RX ORDER — MUPIROCIN 20 MG/G
OINTMENT TOPICAL
Qty: 22 | Refills: 0 | Status: ACTIVE | COMMUNITY
Start: 2018-03-12

## 2023-03-24 RX ORDER — METHOCARBAMOL 500 MG/1
TAKE 1 TABLET AS NEEDED TABLET, FILM COATED ORAL
Refills: 0 | Status: ON HOLD | COMMUNITY

## 2023-03-24 RX ORDER — GABAPENTIN 300 MG/1
TAKE 1 CAPSULE DAILY CAPSULE ORAL
Refills: 0 | Status: ACTIVE | COMMUNITY

## 2023-03-24 RX ORDER — LISINOPRIL 40 MG/1
TAKE 1 TABLET DAILY TABLET ORAL
Refills: 0 | Status: ACTIVE | COMMUNITY

## 2023-03-24 RX ORDER — FLUTICASONE PROPIONATE 44 UG/1
AEROSOL, METERED RESPIRATORY (INHALATION)
Qty: 10 | Refills: 0 | Status: ON HOLD | COMMUNITY
Start: 2019-01-24

## 2023-03-24 RX ORDER — TADALAFIL 20 MG/1
1 TABLET TABLET, COATED ORAL ONCE A DAY
Status: ACTIVE | COMMUNITY

## 2023-03-24 RX ORDER — MONTELUKAST SODIUM 10 MG/1
1 TABLET TABLET, FILM COATED ORAL ONCE A DAY
Status: ACTIVE | COMMUNITY

## 2023-03-24 RX ORDER — METOPROLOL TARTRATE 50 MG/1
TAKE 1 TABLET DAILY TABLET, FILM COATED ORAL
Refills: 0 | Status: ON HOLD | COMMUNITY

## 2023-03-24 RX ORDER — ALBUTEROL SULFATE 90 UG/1
AEROSOL, METERED RESPIRATORY (INHALATION)
Qty: 25 | Refills: 0 | Status: ON HOLD | COMMUNITY
Start: 2018-03-12

## 2023-03-24 RX ORDER — POLYETHYLENE GLYCOL 3350, SODIUM CHLORIDE, SODIUM BICARBONATE AND POTASSIUM CHLORIDE WITH LEMON FLAVOR 420; 11.2; 5.72; 1.48 G/4L; G/4L; G/4L; G/4L
MIX CONTENTS DAY PRIOR TO PROCEDURE, BEGIN DRINKING SOLUTION AT 5PM UNTIL HALF GONE. COMPLETE SOLUTION 6 HOURS PRIOR TO PROCEDURE POWDER, FOR SOLUTION ORAL
Qty: 1 | Refills: 0 | Status: ON HOLD | COMMUNITY
Start: 2019-01-18

## 2023-03-24 RX ORDER — FAMOTIDINE 40 MG/1
TAKE 1 TABLET AT BEDTIME (NEED APPOINTMENT) TABLET ORAL
Qty: 90 TABLET | Refills: 3 | Status: ACTIVE | COMMUNITY

## 2023-03-24 RX ORDER — AMOXICILLIN 500 MG/1
CAPSULE ORAL
Qty: 24 | Refills: 0 | Status: ON HOLD | COMMUNITY
Start: 2017-08-14

## 2023-03-24 RX ORDER — FLUTICASONE PROPIONATE 50 UG/1
SPRAY, METERED NASAL
Qty: 16 | Refills: 0 | Status: ACTIVE | COMMUNITY
Start: 2019-10-29

## 2023-03-24 RX ORDER — HYDROCODONE BITARTRATE AND ACETAMINOPHEN 5; 325 MG/1; MG/1
TAKE 1 TABLET EVERY 4 TO 6 HOURS AS NEEDED FOR PAIN TABLET ORAL
Refills: 0 | Status: ON HOLD | COMMUNITY

## 2023-03-24 RX ORDER — PHENAZOPYRIDINE HYDROCHLORIDE 100 MG/1
TABLET ORAL
Qty: 120 | Refills: 0 | Status: ON HOLD | COMMUNITY
Start: 2018-01-09

## 2023-03-24 RX ORDER — BENZOCAINE 20 G/100G
GEL, DENTIFRICE ORAL
Qty: 90 | Refills: 0 | Status: ON HOLD | COMMUNITY
Start: 2018-03-12

## 2023-03-24 RX ORDER — TADALAFIL 10 MG
TABLET ORAL
Qty: 15 | Refills: 0 | Status: ON HOLD | COMMUNITY
Start: 2018-02-14

## 2023-03-24 RX ORDER — HYDROCODONE BITARTRATE AND ACETAMINOPHEN 10; 325 MG/1; MG/1
TABLET ORAL
Qty: 90 | Refills: 0 | Status: ON HOLD | COMMUNITY
Start: 2018-03-12

## 2023-03-24 RX ORDER — CETIRIZINE HYDROCHLORIDE 10 MG/1
TABLET, FILM COATED ORAL
Qty: 90 | Refills: 0 | Status: ON HOLD | COMMUNITY
Start: 2019-01-17

## 2023-03-24 RX ORDER — SIMVASTATIN 40 MG/1
TAKE 1 TABLET DAILY TABLET, FILM COATED ORAL
Refills: 0 | Status: ON HOLD | COMMUNITY

## 2023-03-24 RX ORDER — TADALAFIL 20 MG/1
TAKE 1 TABLET DAILY 1 HOUR BEFORE NEEDED TABLET, FILM COATED ORAL
Refills: 0 | Status: ON HOLD | COMMUNITY
Start: 2018-06-14

## 2023-03-24 RX ORDER — ALFUZOSIN HCL 10 MG
TAKE 1 TABLET DAILY TABLET, EXTENDED RELEASE 24 HR ORAL
Refills: 0 | Status: ON HOLD | COMMUNITY

## 2023-03-24 RX ORDER — ATORVASTATIN CALCIUM 20 MG/1
TAKE 1 TABLET DAILY TABLET, FILM COATED ORAL
Refills: 0 | Status: ACTIVE | COMMUNITY

## 2023-03-24 RX ORDER — PANTOPRAZOLE SODIUM 40 MG/1
TAKE 1 TABLET DAILY TABLET, DELAYED RELEASE ORAL
Status: ACTIVE | COMMUNITY
Start: 2018-03-12

## 2023-03-24 RX ORDER — SERTRALINE 100 MG/1
1 TABLET TABLET, FILM COATED ORAL ONCE A DAY
Status: ACTIVE | COMMUNITY

## 2023-03-24 RX ORDER — ONDANSETRON 8 MG/1
TAKE 1 TABLET BEFORE MEALS TABLET ORAL
Qty: 90 | Refills: 2 | Status: ACTIVE | COMMUNITY

## 2023-03-24 RX ORDER — FINASTERIDE 5 MG/1
TABLET, FILM COATED ORAL
Qty: 90 | Refills: 0 | Status: ACTIVE | COMMUNITY
Start: 2017-11-29

## 2023-03-24 RX ORDER — MELOXICAM 15 MG/1
TAKE 1 TABLET DAILY TABLET ORAL
Refills: 0 | Status: ON HOLD | COMMUNITY

## 2023-03-24 RX ORDER — SERTRALINE HCL 100 MG
TAKE 1 TABLET DAILY AS DIRECTED TABLET ORAL
Refills: 0 | Status: ON HOLD | COMMUNITY

## 2023-03-24 RX ORDER — LEVOTHYROXINE SODIUM 0.17 MG/1
TAKE 1 TABLET DAILY TABLET ORAL
Refills: 0 | Status: ON HOLD | COMMUNITY

## 2023-03-24 RX ORDER — SILDENAFIL CITRATE 20 MG/1
TAKE 1 TABLET DAILY. PT STATES UNKNOWN DOSAGE TABLET ORAL
Refills: 0 | Status: ON HOLD | COMMUNITY

## 2023-03-24 RX ORDER — AMLODIPINE BESYLATE 10 MG/1
TAKE 1 TABLET DAILY TABLET ORAL
Refills: 0 | Status: ACTIVE | COMMUNITY

## 2023-03-24 RX ORDER — OXYBUTYNIN 5 MG/1
TABLET, FILM COATED, EXTENDED RELEASE ORAL
Qty: 180 | Refills: 0 | Status: ON HOLD | COMMUNITY
Start: 2019-03-20

## 2023-03-24 NOTE — HPI-OTHER HISTORIES
EGD 9/20/19 revealed an irregular Z line, [place]-colored mucosa from 39-40 cm, gastroesophageal flap valve classified as Hill grade II, small hiatal hernia, diffuse moderate gastritis and normal duodenum. GE junction biopsies revealed reflux type changes, negative for Petit's esophagus, gastric biopsies revealed multifocal atrophic gastritis with extensive complete intestinal metaplasia, negative for H. pylori or malignancy.  Colonoscopy (8/5/14) by Dr. Tapia revealed multiple diverticula in the entire colon and otherwise normal colon.

## 2023-03-24 NOTE — HPI-TODAY'S VISIT:
Mr. Persaud is a 77-year-old male with a history of hypertension, stage III chronic kidney disease, sleep apnea, thyroid cancer diagnosed Nov 2022 s/p radiation therapy, prostate cancer, GERD, presenting for follow-up of GERD.  His thyroid cancer recently recurred seen on CT scan.  He is awaiting thyroid biopsy.  He recently starting having more reflux and is currently on pantoprazole in the morning and Pepcid at bedtime.  He did noticed dark colored stools for a few days which resolved on its own 1 month ago.  He does have occasional intermittent small-volume rectal bleeding only when he wipes.  He is on fiber daily and has a daily bowel movement.  He denies constipation or straining.  He had labs with his PCP and Dr Farrell in the last month and was told his kidney function was stable as well as his anemia.  He denies any NSAID use.  He drinks wine every few weeks.    Colonoscopy revealed a normal terminal ileum, many diverticula in the sigmoid, descending, transverse and ascending colon, single small angiectasia in the transverse colon, otherwise normal colon and small internal hemorrhoids.  EGD on 5/19/2021 revealed an irregular Z-line at 40 cm, esophageal mucosal changes suspicious for short segment Petit's esophagus, gastroesophageal flap valve classified as Hill grade II (fold present, opens with respiration), gastric mucosal atrophy, and atrophic nonerosive gastritis. GEJ biopsy showed squamocolumnar mucosa without abnormality, negative for Petit's.  Gastric biopsy showed multifocal atrophic gastritis with focal intestinal metaplasia arising in a background of chronic inactive gastritis with changes suggestive of past H. pylori gastritis.  No H. pylori organisms were identified. CT of the abdomen and pelvis with contrast 4/28/2021 Mild wall thickening involving the pyloric region of the stomach.

## 2023-08-18 ENCOUNTER — OFFICE VISIT (OUTPATIENT)
Dept: URBAN - METROPOLITAN AREA CLINIC 113 | Facility: CLINIC | Age: 78
End: 2023-08-18
Payer: MEDICARE

## 2023-08-18 VITALS
HEART RATE: 101 BPM | RESPIRATION RATE: 14 BRPM | TEMPERATURE: 97.7 F | HEIGHT: 68 IN | DIASTOLIC BLOOD PRESSURE: 73 MMHG | BODY MASS INDEX: 22.43 KG/M2 | WEIGHT: 148 LBS | SYSTOLIC BLOOD PRESSURE: 150 MMHG

## 2023-08-18 DIAGNOSIS — K21.9 GERD: ICD-10-CM

## 2023-08-18 DIAGNOSIS — K55.20 ANGIODYSPLASIA OF COLON: ICD-10-CM

## 2023-08-18 DIAGNOSIS — K57.90 DIVERTICULOSIS: ICD-10-CM

## 2023-08-18 DIAGNOSIS — K62.5 RECTAL BLEEDING: ICD-10-CM

## 2023-08-18 PROCEDURE — 99214 OFFICE O/P EST MOD 30 MIN: CPT | Performed by: INTERNAL MEDICINE

## 2023-08-18 RX ORDER — DUTASTERIDE 0.5 MG
TAKE 1 CAPSULE DAILY CAPSULE ORAL
Refills: 0 | Status: ON HOLD | COMMUNITY

## 2023-08-18 RX ORDER — MONTELUKAST SODIUM 10 MG/1
1 TABLET TABLET, FILM COATED ORAL ONCE A DAY
Status: ACTIVE | COMMUNITY

## 2023-08-18 RX ORDER — METOPROLOL TARTRATE 50 MG/1
TAKE 1 TABLET DAILY TABLET, FILM COATED ORAL
Refills: 0 | Status: ON HOLD | COMMUNITY

## 2023-08-18 RX ORDER — PRAMIPEXOLE DIHYDROCHLORIDE 0.5 MG/1
TAKE 1 TABLET AT BEDTIME TABLET ORAL
Refills: 0 | Status: ON HOLD | COMMUNITY

## 2023-08-18 RX ORDER — TRAZODONE HYDROCHLORIDE 100 MG/1
2 TABLET AT BEDTIME TABLET, FILM COATED ORAL ONCE A DAY
Status: ACTIVE | COMMUNITY

## 2023-08-18 RX ORDER — DOCUSATE SODIUM 100 MG/1
CAPSULE, LIQUID FILLED ORAL
Qty: 90 | Refills: 0 | Status: ON HOLD | COMMUNITY
Start: 2018-03-12

## 2023-08-18 RX ORDER — ONDANSETRON 8 MG/1
TAKE 1 TABLET BEFORE MEALS TABLET ORAL
Qty: 90 | Refills: 2 | Status: ACTIVE | COMMUNITY

## 2023-08-18 RX ORDER — ALBUTEROL SULFATE 90 UG/1
AEROSOL, METERED RESPIRATORY (INHALATION)
Qty: 25 | Refills: 0 | Status: ON HOLD | COMMUNITY
Start: 2018-03-12

## 2023-08-18 RX ORDER — SUCRALFATE 1 G
1 TABLET ON AN EMPTY STOMACH TABLET ORAL
Qty: 60 | Refills: 1 | OUTPATIENT
Start: 2023-08-18 | End: 2023-10-17

## 2023-08-18 RX ORDER — MELOXICAM 15 MG/1
TAKE 1 TABLET DAILY TABLET ORAL
Refills: 0 | Status: ON HOLD | COMMUNITY

## 2023-08-18 RX ORDER — TADALAFIL 20 MG/1
1 TABLET TABLET, COATED ORAL ONCE A DAY
Status: ACTIVE | COMMUNITY

## 2023-08-18 RX ORDER — PHENAZOPYRIDINE HYDROCHLORIDE 100 MG/1
TABLET ORAL
Qty: 120 | Refills: 0 | Status: ON HOLD | COMMUNITY
Start: 2018-01-09

## 2023-08-18 RX ORDER — POLYETHYLENE GLYCOL 3350, SODIUM CHLORIDE, SODIUM BICARBONATE AND POTASSIUM CHLORIDE WITH LEMON FLAVOR 420; 11.2; 5.72; 1.48 G/4L; G/4L; G/4L; G/4L
MIX CONTENTS DAY PRIOR TO PROCEDURE, BEGIN DRINKING SOLUTION AT 5PM UNTIL HALF GONE. COMPLETE SOLUTION 6 HOURS PRIOR TO PROCEDURE POWDER, FOR SOLUTION ORAL
Qty: 1 | Refills: 0 | Status: ON HOLD | COMMUNITY
Start: 2019-01-18

## 2023-08-18 RX ORDER — DUTASTERIDE 0.5 MG/1
1 CAPSULE CAPSULE, LIQUID FILLED ORAL ONCE A DAY
Status: ACTIVE | COMMUNITY

## 2023-08-18 RX ORDER — CETIRIZINE HYDROCHLORIDE 10 MG/1
TABLET, FILM COATED ORAL
Qty: 90 | Refills: 0 | Status: ON HOLD | COMMUNITY
Start: 2019-01-17

## 2023-08-18 RX ORDER — SIMVASTATIN 40 MG/1
TAKE 1 TABLET DAILY TABLET, FILM COATED ORAL
Refills: 0 | Status: ON HOLD | COMMUNITY

## 2023-08-18 RX ORDER — PANTOPRAZOLE SODIUM 40 MG/1
TAKE 1 TABLET DAILY TABLET, DELAYED RELEASE ORAL
Status: ACTIVE | COMMUNITY
Start: 2018-03-12

## 2023-08-18 RX ORDER — AMLODIPINE BESYLATE 10 MG/1
TAKE 1 TABLET DAILY TABLET ORAL
Refills: 0 | Status: ACTIVE | COMMUNITY

## 2023-08-18 RX ORDER — SILDENAFIL CITRATE 20 MG/1
TAKE 1 TABLET DAILY. PT STATES UNKNOWN DOSAGE TABLET ORAL
Refills: 0 | Status: ON HOLD | COMMUNITY

## 2023-08-18 RX ORDER — ACYCLOVIR 50 MG/G
OINTMENT TOPICAL
Qty: 15 | Refills: 0 | Status: ACTIVE | COMMUNITY
Start: 2019-09-25

## 2023-08-18 RX ORDER — PANTOPRAZOLE SODIUM 40 MG/1
TAKE 1 TABLET DAILY TABLET, DELAYED RELEASE ORAL
OUTPATIENT
Start: 2018-03-12

## 2023-08-18 RX ORDER — OXYBUTYNIN 5 MG/1
TABLET, FILM COATED, EXTENDED RELEASE ORAL
Qty: 180 | Refills: 0 | Status: ON HOLD | COMMUNITY
Start: 2019-03-20

## 2023-08-18 RX ORDER — FLUTICASONE PROPIONATE 44 UG/1
AEROSOL, METERED RESPIRATORY (INHALATION)
Qty: 10 | Refills: 0 | Status: ON HOLD | COMMUNITY
Start: 2019-01-24

## 2023-08-18 RX ORDER — METHOCARBAMOL 500 MG/1
TAKE 1 TABLET AS NEEDED TABLET, FILM COATED ORAL
Refills: 0 | Status: ON HOLD | COMMUNITY

## 2023-08-18 RX ORDER — TADALAFIL 20 MG/1
TAKE 1 TABLET DAILY 1 HOUR BEFORE NEEDED TABLET, FILM COATED ORAL
Refills: 0 | Status: ON HOLD | COMMUNITY
Start: 2018-06-14

## 2023-08-18 RX ORDER — MUPIROCIN 20 MG/G
OINTMENT TOPICAL
Qty: 22 | Refills: 0 | Status: ACTIVE | COMMUNITY
Start: 2018-03-12

## 2023-08-18 RX ORDER — SERTRALINE HCL 100 MG
TAKE 1 TABLET DAILY AS DIRECTED TABLET ORAL
Refills: 0 | Status: ON HOLD | COMMUNITY

## 2023-08-18 RX ORDER — LISINOPRIL 40 MG/1
TAKE 1 TABLET DAILY TABLET ORAL
Refills: 0 | Status: ACTIVE | COMMUNITY

## 2023-08-18 RX ORDER — GABAPENTIN 300 MG/1
TAKE 1 CAPSULE DAILY CAPSULE ORAL
Refills: 0 | Status: ACTIVE | COMMUNITY

## 2023-08-18 RX ORDER — DOCUSATE SODIUM 100 MG
TAKE 1 TABLET DAILY AS DIRECTED TABLET ORAL
Refills: 0 | Status: ACTIVE | COMMUNITY

## 2023-08-18 RX ORDER — LEVOTHYROXINE SODIUM 0.15 MG/1
TABLET ORAL
Qty: 90 | Refills: 0 | Status: ACTIVE | COMMUNITY
Start: 2018-09-14

## 2023-08-18 RX ORDER — FLUTICASONE PROPIONATE 50 UG/1
SPRAY, METERED NASAL
Qty: 16 | Refills: 0 | Status: ACTIVE | COMMUNITY
Start: 2019-10-29

## 2023-08-18 RX ORDER — SERTRALINE 100 MG/1
1 TABLET TABLET, FILM COATED ORAL ONCE A DAY
Status: ACTIVE | COMMUNITY

## 2023-08-18 RX ORDER — METHOCARBAMOL 500 MG/1
1 TABLET TABLET ORAL ONCE DAILY
Status: ACTIVE | COMMUNITY

## 2023-08-18 RX ORDER — HYDROCODONE BITARTRATE AND ACETAMINOPHEN 10; 325 MG/1; MG/1
TABLET ORAL
Qty: 90 | Refills: 0 | Status: ON HOLD | COMMUNITY
Start: 2018-03-12

## 2023-08-18 RX ORDER — PRAMIPEXOLE DIHYDROCHLORIDE 0.5 MG/1
1 TABLET TABLET ORAL ONCE A DAY
Status: ACTIVE | COMMUNITY

## 2023-08-18 RX ORDER — LEVOTHYROXINE SODIUM 0.17 MG/1
TAKE 1 TABLET DAILY TABLET ORAL
Refills: 0 | Status: ON HOLD | COMMUNITY

## 2023-08-18 RX ORDER — FINASTERIDE 5 MG/1
TABLET, FILM COATED ORAL
Qty: 90 | Refills: 0 | Status: ACTIVE | COMMUNITY
Start: 2017-11-29

## 2023-08-18 RX ORDER — HYDROCHLOROTHIAZIDE 25 MG/1
TAKE 1 TABLET DAILY TABLET ORAL
Refills: 0 | Status: ACTIVE | COMMUNITY

## 2023-08-18 RX ORDER — ALFUZOSIN HCL 10 MG
TAKE 1 TABLET DAILY TABLET, EXTENDED RELEASE 24 HR ORAL
Refills: 0 | Status: ON HOLD | COMMUNITY

## 2023-08-18 RX ORDER — AMOXICILLIN 500 MG/1
CAPSULE ORAL
Qty: 24 | Refills: 0 | Status: ON HOLD | COMMUNITY
Start: 2017-08-14

## 2023-08-18 RX ORDER — FAMOTIDINE 40 MG/1
TAKE 1 TABLET AT BEDTIME (NEED APPOINTMENT) TABLET ORAL
OUTPATIENT

## 2023-08-18 RX ORDER — FAMOTIDINE 40 MG/1
TAKE 1 TABLET AT BEDTIME (NEED APPOINTMENT) TABLET ORAL
Qty: 90 TABLET | Refills: 3 | Status: ACTIVE | COMMUNITY

## 2023-08-18 RX ORDER — ATORVASTATIN CALCIUM 20 MG/1
TAKE 1 TABLET DAILY TABLET, FILM COATED ORAL
Refills: 0 | Status: ACTIVE | COMMUNITY

## 2023-08-18 RX ORDER — BENZOCAINE 20 G/100G
GEL, DENTIFRICE ORAL
Qty: 90 | Refills: 0 | Status: ON HOLD | COMMUNITY
Start: 2018-03-12

## 2023-08-18 RX ORDER — WHEAT DEXTRIN 3 G/4 G
TAKE 2 TSP DAILY. AS NEEDED POWDER (GRAM) ORAL
Refills: 0 | Status: ON HOLD | COMMUNITY

## 2023-08-18 RX ORDER — TADALAFIL 10 MG
TABLET ORAL
Qty: 15 | Refills: 0 | Status: ON HOLD | COMMUNITY
Start: 2018-02-14

## 2023-08-18 RX ORDER — HYDROCODONE BITARTRATE AND ACETAMINOPHEN 5; 325 MG/1; MG/1
TAKE 1 TABLET EVERY 4 TO 6 HOURS AS NEEDED FOR PAIN TABLET ORAL
Refills: 0 | Status: ON HOLD | COMMUNITY

## 2023-08-18 NOTE — HPI-OTHER HISTORIES
EGD 9/20/19 revealed an irregular Z line, [place]-colored mucosa from 39-40 cm, gastroesophageal flap valve classified as Hill grade II, small hiatal hernia, diffuse moderate gastritis and normal duodenum. GE junction biopsies revealed reflux type changes, negative for Petit's esophagus, gastric biopsies revealed multifocal atrophic gastritis with extensive complete intestinal metaplasia, negative for H. pylori or malignancy.  Colonoscopy (8/5/14) by Dr. Tapia revealed multiple diverticula in the entire colon and otherwise normal colon.

## 2023-08-18 NOTE — HPI-TODAY'S VISIT:
Mr. Persaud is a 77-year-old male with a history of hypertension, stage III chronic kidney disease, sleep apnea, thyroid cancer diagnosed s/p radiation therapy, prostate cancer, GERD, presenting for follow-up of GERD.  His thyroid cancer recently recurred seen on CT scan.  He completed radiation therapy again around July.  He has been having increasing GERD symptoms despite pantoprazole once daily and Pepcid at bedtime.  He has intermittent nausea and vomiting.  His weight overall is stable.  He denies abdominal pain, change in bowel habits or blood in the stool.  No improvement with twice daily Protonix  Colonoscopy revealed a normal terminal ileum, many diverticula in the sigmoid, descending, transverse and ascending colon, single small angiectasia in the transverse colon, otherwise normal colon and small internal hemorrhoids.  EGD on 5/19/2021 revealed an irregular Z-line at 40 cm, esophageal mucosal changes suspicious for short segment Petit's esophagus, gastroesophageal flap valve classified as Hill grade II (fold present, opens with respiration), gastric mucosal atrophy, and atrophic nonerosive gastritis. GEJ biopsy showed squamocolumnar mucosa without abnormality, negative for Petit's.  Gastric biopsy showed multifocal atrophic gastritis with focal intestinal metaplasia arising in a background of chronic inactive gastritis with changes suggestive of past H. pylori gastritis.  No H. pylori organisms were identified. CT of the abdomen and pelvis with contrast 4/28/2021 Mild wall thickening involving the pyloric region of the stomach.

## 2023-08-22 ENCOUNTER — CLAIMS CREATED FROM THE CLAIM WINDOW (OUTPATIENT)
Dept: URBAN - METROPOLITAN AREA CLINIC 4 | Facility: CLINIC | Age: 78
End: 2023-08-22
Payer: MEDICARE

## 2023-08-22 ENCOUNTER — OUT OF OFFICE VISIT (OUTPATIENT)
Dept: URBAN - METROPOLITAN AREA SURGERY CENTER 25 | Facility: SURGERY CENTER | Age: 78
End: 2023-08-22
Payer: MEDICARE

## 2023-08-22 ENCOUNTER — CLAIMS CREATED FROM THE CLAIM WINDOW (OUTPATIENT)
Dept: URBAN - METROPOLITAN AREA SURGERY CENTER 25 | Facility: SURGERY CENTER | Age: 78
End: 2023-08-22

## 2023-08-22 DIAGNOSIS — K29.40 CHRONIC ATROPHIC GASTRITIS WITHOUT BLEEDING: ICD-10-CM

## 2023-08-22 DIAGNOSIS — K22.89 OTHER SPECIFIED DISEASE OF ESOPHAGUS: ICD-10-CM

## 2023-08-22 DIAGNOSIS — K31.89 OTHER DISEASES OF STOMACH AND DUODENUM: ICD-10-CM

## 2023-08-22 DIAGNOSIS — Q39.4 ESOPHAGEAL WEB: ICD-10-CM

## 2023-08-22 DIAGNOSIS — T47.8X5A ADVERSE EFFECT OF OTHER AGENTS PRIMARILY AFFECTING GASTROINTESTINAL SYSTEM, INITIAL ENCOUNTER: ICD-10-CM

## 2023-08-22 DIAGNOSIS — K31.89 ACHYLIA: ICD-10-CM

## 2023-08-22 DIAGNOSIS — K22.719 BARRETT'S ESOPHAGUS WITH DYSPLASIA: ICD-10-CM

## 2023-08-22 DIAGNOSIS — K22.719 BARRETT'S ESOPHAGUS WITH DYSPLASIA, UNSPECIFIED: ICD-10-CM

## 2023-08-22 DIAGNOSIS — R12 BURNING REFLUX: ICD-10-CM

## 2023-08-22 DIAGNOSIS — K44.9 HIATAL HERNIA: ICD-10-CM

## 2023-08-22 PROCEDURE — 43239 EGD BIOPSY SINGLE/MULTIPLE: CPT | Performed by: INTERNAL MEDICINE

## 2023-08-22 PROCEDURE — 88312 SPECIAL STAINS GROUP 1: CPT | Performed by: PATHOLOGY

## 2023-08-22 PROCEDURE — 00731 ANES UPR GI NDSC PX NOS: CPT | Performed by: ANESTHESIOLOGY

## 2023-08-22 PROCEDURE — 00731 ANES UPR GI NDSC PX NOS: CPT | Performed by: NURSE ANESTHETIST, CERTIFIED REGISTERED

## 2023-08-22 PROCEDURE — 88313 SPECIAL STAINS GROUP 2: CPT | Performed by: PATHOLOGY

## 2023-08-22 PROCEDURE — G8907 PT DOC NO EVENTS ON DISCHARG: HCPCS | Performed by: INTERNAL MEDICINE

## 2023-08-22 PROCEDURE — 88305 TISSUE EXAM BY PATHOLOGIST: CPT | Performed by: PATHOLOGY

## 2023-08-22 RX ORDER — FINASTERIDE 5 MG/1
TABLET, FILM COATED ORAL
Qty: 90 | Refills: 0 | Status: ACTIVE | COMMUNITY
Start: 2017-11-29

## 2023-08-22 RX ORDER — SUCRALFATE 1 G
1 TABLET ON AN EMPTY STOMACH TABLET ORAL
Qty: 60 | Refills: 1 | Status: ACTIVE | COMMUNITY
Start: 2023-08-18 | End: 2023-10-17

## 2023-08-22 RX ORDER — SERTRALINE 100 MG/1
1 TABLET TABLET, FILM COATED ORAL ONCE A DAY
Status: ACTIVE | COMMUNITY

## 2023-08-22 RX ORDER — PRAMIPEXOLE DIHYDROCHLORIDE 0.5 MG/1
TAKE 1 TABLET AT BEDTIME TABLET ORAL
Refills: 0 | Status: ON HOLD | COMMUNITY

## 2023-08-22 RX ORDER — MONTELUKAST SODIUM 10 MG/1
1 TABLET TABLET, FILM COATED ORAL ONCE A DAY
Status: ACTIVE | COMMUNITY

## 2023-08-22 RX ORDER — ALFUZOSIN HCL 10 MG
TAKE 1 TABLET DAILY TABLET, EXTENDED RELEASE 24 HR ORAL
Refills: 0 | Status: ON HOLD | COMMUNITY

## 2023-08-22 RX ORDER — GABAPENTIN 300 MG/1
TAKE 1 CAPSULE DAILY CAPSULE ORAL
Refills: 0 | Status: ACTIVE | COMMUNITY

## 2023-08-22 RX ORDER — METHOCARBAMOL 500 MG/1
TAKE 1 TABLET AS NEEDED TABLET, FILM COATED ORAL
Refills: 0 | Status: ON HOLD | COMMUNITY

## 2023-08-22 RX ORDER — DOCUSATE SODIUM 100 MG/1
CAPSULE, LIQUID FILLED ORAL
Qty: 90 | Refills: 0 | Status: ON HOLD | COMMUNITY
Start: 2018-03-12

## 2023-08-22 RX ORDER — MUPIROCIN 20 MG/G
OINTMENT TOPICAL
Qty: 22 | Refills: 0 | Status: ACTIVE | COMMUNITY
Start: 2018-03-12

## 2023-08-22 RX ORDER — METOPROLOL TARTRATE 50 MG/1
TAKE 1 TABLET DAILY TABLET, FILM COATED ORAL
Refills: 0 | Status: ON HOLD | COMMUNITY

## 2023-08-22 RX ORDER — SILDENAFIL CITRATE 20 MG/1
TAKE 1 TABLET DAILY. PT STATES UNKNOWN DOSAGE TABLET ORAL
Refills: 0 | Status: ON HOLD | COMMUNITY

## 2023-08-22 RX ORDER — SERTRALINE HCL 100 MG
TAKE 1 TABLET DAILY AS DIRECTED TABLET ORAL
Refills: 0 | Status: ON HOLD | COMMUNITY

## 2023-08-22 RX ORDER — HYDROCHLOROTHIAZIDE 25 MG/1
TAKE 1 TABLET DAILY TABLET ORAL
Refills: 0 | Status: ACTIVE | COMMUNITY

## 2023-08-22 RX ORDER — LEVOTHYROXINE SODIUM 0.15 MG/1
TABLET ORAL
Qty: 90 | Refills: 0 | Status: ACTIVE | COMMUNITY
Start: 2018-09-14

## 2023-08-22 RX ORDER — ONDANSETRON 8 MG/1
TAKE 1 TABLET BEFORE MEALS TABLET ORAL
Qty: 90 | Refills: 2 | Status: ACTIVE | COMMUNITY

## 2023-08-22 RX ORDER — HYDROCODONE BITARTRATE AND ACETAMINOPHEN 5; 325 MG/1; MG/1
TAKE 1 TABLET EVERY 4 TO 6 HOURS AS NEEDED FOR PAIN TABLET ORAL
Refills: 0 | Status: ON HOLD | COMMUNITY

## 2023-08-22 RX ORDER — BENZOCAINE 20 G/100G
GEL, DENTIFRICE ORAL
Qty: 90 | Refills: 0 | Status: ON HOLD | COMMUNITY
Start: 2018-03-12

## 2023-08-22 RX ORDER — CETIRIZINE HYDROCHLORIDE 10 MG/1
TABLET, FILM COATED ORAL
Qty: 90 | Refills: 0 | Status: ON HOLD | COMMUNITY
Start: 2019-01-17

## 2023-08-22 RX ORDER — PANTOPRAZOLE SODIUM 40 MG/1
TAKE 1 TABLET DAILY TABLET, DELAYED RELEASE ORAL
Status: ACTIVE | COMMUNITY
Start: 2018-03-12

## 2023-08-22 RX ORDER — PHENAZOPYRIDINE HYDROCHLORIDE 100 MG/1
TABLET ORAL
Qty: 120 | Refills: 0 | Status: ON HOLD | COMMUNITY
Start: 2018-01-09

## 2023-08-22 RX ORDER — LEVOTHYROXINE SODIUM 0.17 MG/1
TAKE 1 TABLET DAILY TABLET ORAL
Refills: 0 | Status: ON HOLD | COMMUNITY

## 2023-08-22 RX ORDER — METHOCARBAMOL 500 MG/1
1 TABLET TABLET ORAL ONCE DAILY
Status: ACTIVE | COMMUNITY

## 2023-08-22 RX ORDER — FAMOTIDINE 40 MG/1
TAKE 1 TABLET AT BEDTIME (NEED APPOINTMENT) TABLET ORAL
Status: ACTIVE | COMMUNITY

## 2023-08-22 RX ORDER — PRAMIPEXOLE DIHYDROCHLORIDE 0.5 MG/1
1 TABLET TABLET ORAL ONCE A DAY
Status: ACTIVE | COMMUNITY

## 2023-08-22 RX ORDER — AMOXICILLIN 500 MG/1
CAPSULE ORAL
Qty: 24 | Refills: 0 | Status: ON HOLD | COMMUNITY
Start: 2017-08-14

## 2023-08-22 RX ORDER — FLUTICASONE PROPIONATE 44 UG/1
AEROSOL, METERED RESPIRATORY (INHALATION)
Qty: 10 | Refills: 0 | Status: ON HOLD | COMMUNITY
Start: 2019-01-24

## 2023-08-22 RX ORDER — TRAZODONE HYDROCHLORIDE 100 MG/1
2 TABLET AT BEDTIME TABLET, FILM COATED ORAL ONCE A DAY
Status: ACTIVE | COMMUNITY

## 2023-08-22 RX ORDER — TADALAFIL 20 MG/1
1 TABLET TABLET, COATED ORAL ONCE A DAY
Status: ACTIVE | COMMUNITY

## 2023-08-22 RX ORDER — OXYBUTYNIN 5 MG/1
TABLET, FILM COATED, EXTENDED RELEASE ORAL
Qty: 180 | Refills: 0 | Status: ON HOLD | COMMUNITY
Start: 2019-03-20

## 2023-08-22 RX ORDER — LISINOPRIL 40 MG/1
TAKE 1 TABLET DAILY TABLET ORAL
Refills: 0 | Status: ACTIVE | COMMUNITY

## 2023-08-22 RX ORDER — TADALAFIL 20 MG/1
TAKE 1 TABLET DAILY 1 HOUR BEFORE NEEDED TABLET, FILM COATED ORAL
Refills: 0 | Status: ON HOLD | COMMUNITY
Start: 2018-06-14

## 2023-08-22 RX ORDER — HYDROCODONE BITARTRATE AND ACETAMINOPHEN 10; 325 MG/1; MG/1
TABLET ORAL
Qty: 90 | Refills: 0 | Status: ON HOLD | COMMUNITY
Start: 2018-03-12

## 2023-08-22 RX ORDER — DUTASTERIDE 0.5 MG/1
1 CAPSULE CAPSULE, LIQUID FILLED ORAL ONCE A DAY
Status: ACTIVE | COMMUNITY

## 2023-08-22 RX ORDER — POLYETHYLENE GLYCOL 3350, SODIUM CHLORIDE, SODIUM BICARBONATE AND POTASSIUM CHLORIDE WITH LEMON FLAVOR 420; 11.2; 5.72; 1.48 G/4L; G/4L; G/4L; G/4L
MIX CONTENTS DAY PRIOR TO PROCEDURE, BEGIN DRINKING SOLUTION AT 5PM UNTIL HALF GONE. COMPLETE SOLUTION 6 HOURS PRIOR TO PROCEDURE POWDER, FOR SOLUTION ORAL
Qty: 1 | Refills: 0 | Status: ON HOLD | COMMUNITY
Start: 2019-01-18

## 2023-08-22 RX ORDER — TADALAFIL 10 MG
TABLET ORAL
Qty: 15 | Refills: 0 | Status: ON HOLD | COMMUNITY
Start: 2018-02-14

## 2023-08-22 RX ORDER — WHEAT DEXTRIN 3 G/4 G
TAKE 2 TSP DAILY. AS NEEDED POWDER (GRAM) ORAL
Refills: 0 | Status: ON HOLD | COMMUNITY

## 2023-08-22 RX ORDER — FLUTICASONE PROPIONATE 50 UG/1
SPRAY, METERED NASAL
Qty: 16 | Refills: 0 | Status: ACTIVE | COMMUNITY
Start: 2019-10-29

## 2023-08-22 RX ORDER — ALBUTEROL SULFATE 90 UG/1
AEROSOL, METERED RESPIRATORY (INHALATION)
Qty: 25 | Refills: 0 | Status: ON HOLD | COMMUNITY
Start: 2018-03-12

## 2023-08-22 RX ORDER — ACYCLOVIR 50 MG/G
OINTMENT TOPICAL
Qty: 15 | Refills: 0 | Status: ACTIVE | COMMUNITY
Start: 2019-09-25

## 2023-08-22 RX ORDER — ATORVASTATIN CALCIUM 20 MG/1
TAKE 1 TABLET DAILY TABLET, FILM COATED ORAL
Refills: 0 | Status: ACTIVE | COMMUNITY

## 2023-08-22 RX ORDER — DUTASTERIDE 0.5 MG
TAKE 1 CAPSULE DAILY CAPSULE ORAL
Refills: 0 | Status: ON HOLD | COMMUNITY

## 2023-08-22 RX ORDER — AMLODIPINE BESYLATE 10 MG/1
TAKE 1 TABLET DAILY TABLET ORAL
Refills: 0 | Status: ACTIVE | COMMUNITY

## 2023-08-22 RX ORDER — MELOXICAM 15 MG/1
TAKE 1 TABLET DAILY TABLET ORAL
Refills: 0 | Status: ON HOLD | COMMUNITY

## 2023-08-22 RX ORDER — SIMVASTATIN 40 MG/1
TAKE 1 TABLET DAILY TABLET, FILM COATED ORAL
Refills: 0 | Status: ON HOLD | COMMUNITY

## 2023-08-22 RX ORDER — DOCUSATE SODIUM 100 MG
TAKE 1 TABLET DAILY AS DIRECTED TABLET ORAL
Refills: 0 | Status: ACTIVE | COMMUNITY

## 2023-08-25 ENCOUNTER — TELEPHONE ENCOUNTER (OUTPATIENT)
Dept: URBAN - METROPOLITAN AREA CLINIC 113 | Facility: CLINIC | Age: 78
End: 2023-08-25

## 2023-11-28 ENCOUNTER — DASHBOARD ENCOUNTERS (OUTPATIENT)
Age: 78
End: 2023-11-28

## 2023-11-28 ENCOUNTER — OFFICE VISIT (OUTPATIENT)
Dept: URBAN - METROPOLITAN AREA CLINIC 113 | Facility: CLINIC | Age: 78
End: 2023-11-28
Payer: MEDICARE

## 2023-11-28 VITALS
WEIGHT: 147 LBS | HEIGHT: 68 IN | TEMPERATURE: 97.5 F | DIASTOLIC BLOOD PRESSURE: 55 MMHG | HEART RATE: 93 BPM | BODY MASS INDEX: 22.28 KG/M2 | RESPIRATION RATE: 18 BRPM | SYSTOLIC BLOOD PRESSURE: 92 MMHG

## 2023-11-28 DIAGNOSIS — D64.9 NORMOCYTIC ANEMIA: ICD-10-CM

## 2023-11-28 DIAGNOSIS — K62.5 RECTAL BLEEDING: ICD-10-CM

## 2023-11-28 DIAGNOSIS — K57.90 DIVERTICULOSIS: ICD-10-CM

## 2023-11-28 DIAGNOSIS — K21.9 GERD: ICD-10-CM

## 2023-11-28 DIAGNOSIS — K22.70 BARRETT'S ESOPHAGUS WITHOUT DYSPLASIA: ICD-10-CM

## 2023-11-28 PROBLEM — 302914006: Status: ACTIVE | Noted: 2023-11-28

## 2023-11-28 PROCEDURE — 99213 OFFICE O/P EST LOW 20 MIN: CPT | Performed by: INTERNAL MEDICINE

## 2023-11-28 RX ORDER — TADALAFIL 20 MG/1
TAKE 1 TABLET DAILY 1 HOUR BEFORE NEEDED TABLET, FILM COATED ORAL
Refills: 0 | Status: ON HOLD | COMMUNITY
Start: 2018-06-14

## 2023-11-28 RX ORDER — FAMOTIDINE 40 MG/1
TAKE 1 TABLET AT BEDTIME (NEED APPOINTMENT) TABLET ORAL
Status: ACTIVE | COMMUNITY

## 2023-11-28 RX ORDER — HYDROCHLOROTHIAZIDE 25 MG/1
TAKE 1 TABLET DAILY TABLET ORAL
Refills: 0 | Status: ACTIVE | COMMUNITY

## 2023-11-28 RX ORDER — PHENAZOPYRIDINE HYDROCHLORIDE 100 MG/1
TABLET ORAL
Qty: 120 | Refills: 0 | Status: ON HOLD | COMMUNITY
Start: 2018-01-09

## 2023-11-28 RX ORDER — TADALAFIL 20 MG/1
1 TABLET TABLET, COATED ORAL ONCE A DAY
Status: ACTIVE | COMMUNITY

## 2023-11-28 RX ORDER — TRAZODONE HYDROCHLORIDE 100 MG/1
2 TABLET AT BEDTIME TABLET, FILM COATED ORAL ONCE A DAY
Status: ACTIVE | COMMUNITY

## 2023-11-28 RX ORDER — ONDANSETRON 8 MG/1
TAKE 1 TABLET BEFORE MEALS TABLET ORAL
Qty: 90 | Refills: 2 | Status: ACTIVE | COMMUNITY

## 2023-11-28 RX ORDER — POLYETHYLENE GLYCOL 3350, SODIUM CHLORIDE, SODIUM BICARBONATE AND POTASSIUM CHLORIDE WITH LEMON FLAVOR 420; 11.2; 5.72; 1.48 G/4L; G/4L; G/4L; G/4L
MIX CONTENTS DAY PRIOR TO PROCEDURE, BEGIN DRINKING SOLUTION AT 5PM UNTIL HALF GONE. COMPLETE SOLUTION 6 HOURS PRIOR TO PROCEDURE POWDER, FOR SOLUTION ORAL
Qty: 1 | Refills: 0 | Status: ON HOLD | COMMUNITY
Start: 2019-01-18

## 2023-11-28 RX ORDER — ATORVASTATIN CALCIUM 20 MG/1
TAKE 1 TABLET DAILY TABLET, FILM COATED ORAL
Refills: 0 | Status: ACTIVE | COMMUNITY

## 2023-11-28 RX ORDER — DUTASTERIDE 0.5 MG/1
1 CAPSULE CAPSULE, LIQUID FILLED ORAL ONCE A DAY
Status: ACTIVE | COMMUNITY

## 2023-11-28 RX ORDER — PANTOPRAZOLE SODIUM 40 MG/1
TAKE 1 TABLET DAILY TABLET, DELAYED RELEASE ORAL
Status: ACTIVE | COMMUNITY
Start: 2018-03-12

## 2023-11-28 RX ORDER — METOPROLOL TARTRATE 50 MG/1
TAKE 1 TABLET DAILY TABLET, FILM COATED ORAL
Refills: 0 | Status: ON HOLD | COMMUNITY

## 2023-11-28 RX ORDER — WHEAT DEXTRIN 3 G/4 G
TAKE 2 TSP DAILY. AS NEEDED POWDER (GRAM) ORAL
Refills: 0 | Status: ON HOLD | COMMUNITY

## 2023-11-28 RX ORDER — HYDROCODONE BITARTRATE AND ACETAMINOPHEN 5; 325 MG/1; MG/1
TAKE 1 TABLET EVERY 4 TO 6 HOURS AS NEEDED FOR PAIN TABLET ORAL
Refills: 0 | Status: ON HOLD | COMMUNITY

## 2023-11-28 RX ORDER — ACYCLOVIR 50 MG/G
OINTMENT TOPICAL
Qty: 15 | Refills: 0 | Status: ACTIVE | COMMUNITY
Start: 2019-09-25

## 2023-11-28 RX ORDER — SERTRALINE 100 MG/1
1 TABLET TABLET, FILM COATED ORAL ONCE A DAY
Status: ACTIVE | COMMUNITY

## 2023-11-28 RX ORDER — CETIRIZINE HYDROCHLORIDE 10 MG/1
TABLET, FILM COATED ORAL
Qty: 90 | Refills: 0 | Status: ON HOLD | COMMUNITY
Start: 2019-01-17

## 2023-11-28 RX ORDER — PRAMIPEXOLE DIHYDROCHLORIDE 0.5 MG/1
1 TABLET TABLET ORAL ONCE A DAY
Status: ACTIVE | COMMUNITY

## 2023-11-28 RX ORDER — METHOCARBAMOL 500 MG/1
1 TABLET TABLET ORAL ONCE DAILY
Status: ACTIVE | COMMUNITY

## 2023-11-28 RX ORDER — FLUTICASONE PROPIONATE 50 UG/1
SPRAY, METERED NASAL
Qty: 16 | Refills: 0 | Status: ACTIVE | COMMUNITY
Start: 2019-10-29

## 2023-11-28 RX ORDER — LEVOTHYROXINE SODIUM 0.15 MG/1
TABLET ORAL
Qty: 90 | Refills: 0 | Status: ACTIVE | COMMUNITY
Start: 2018-09-14

## 2023-11-28 RX ORDER — LEVOTHYROXINE SODIUM 0.17 MG/1
TAKE 1 TABLET DAILY TABLET ORAL
Refills: 0 | Status: ON HOLD | COMMUNITY

## 2023-11-28 RX ORDER — GABAPENTIN 300 MG/1
TAKE 1 CAPSULE DAILY CAPSULE ORAL
Refills: 0 | Status: ACTIVE | COMMUNITY

## 2023-11-28 RX ORDER — DOCUSATE SODIUM 100 MG/1
CAPSULE, LIQUID FILLED ORAL
Qty: 90 | Refills: 0 | Status: ON HOLD | COMMUNITY
Start: 2018-03-12

## 2023-11-28 RX ORDER — MUPIROCIN 20 MG/G
OINTMENT TOPICAL
Qty: 22 | Refills: 0 | Status: ACTIVE | COMMUNITY
Start: 2018-03-12

## 2023-11-28 RX ORDER — SIMVASTATIN 40 MG/1
TAKE 1 TABLET DAILY TABLET, FILM COATED ORAL
Refills: 0 | Status: ON HOLD | COMMUNITY

## 2023-11-28 RX ORDER — OXYBUTYNIN 5 MG/1
TABLET, FILM COATED, EXTENDED RELEASE ORAL
Qty: 180 | Refills: 0 | Status: ON HOLD | COMMUNITY
Start: 2019-03-20

## 2023-11-28 RX ORDER — MELOXICAM 15 MG/1
TAKE 1 TABLET DAILY TABLET ORAL
Refills: 0 | Status: ON HOLD | COMMUNITY

## 2023-11-28 RX ORDER — HYDROCODONE BITARTRATE AND ACETAMINOPHEN 10; 325 MG/1; MG/1
TABLET ORAL
Qty: 90 | Refills: 0 | Status: ON HOLD | COMMUNITY
Start: 2018-03-12

## 2023-11-28 RX ORDER — PRAMIPEXOLE DIHYDROCHLORIDE 0.5 MG/1
TAKE 1 TABLET AT BEDTIME TABLET ORAL
Refills: 0 | Status: ON HOLD | COMMUNITY

## 2023-11-28 RX ORDER — SERTRALINE HCL 100 MG
TAKE 1 TABLET DAILY AS DIRECTED TABLET ORAL
Refills: 0 | Status: ON HOLD | COMMUNITY

## 2023-11-28 RX ORDER — AMOXICILLIN 500 MG/1
CAPSULE ORAL
Qty: 24 | Refills: 0 | Status: ON HOLD | COMMUNITY
Start: 2017-08-14

## 2023-11-28 RX ORDER — FLUTICASONE PROPIONATE 44 UG/1
AEROSOL, METERED RESPIRATORY (INHALATION)
Qty: 10 | Refills: 0 | Status: ON HOLD | COMMUNITY
Start: 2019-01-24

## 2023-11-28 RX ORDER — LISINOPRIL 40 MG/1
TAKE 1 TABLET DAILY TABLET ORAL
Refills: 0 | Status: ACTIVE | COMMUNITY

## 2023-11-28 RX ORDER — DOCUSATE SODIUM 100 MG
TAKE 1 TABLET DAILY AS DIRECTED TABLET ORAL
Refills: 0 | Status: ACTIVE | COMMUNITY

## 2023-11-28 RX ORDER — ALFUZOSIN HCL 10 MG
TAKE 1 TABLET DAILY TABLET, EXTENDED RELEASE 24 HR ORAL
Refills: 0 | Status: ON HOLD | COMMUNITY

## 2023-11-28 RX ORDER — TADALAFIL 10 MG
TABLET ORAL
Qty: 15 | Refills: 0 | Status: ON HOLD | COMMUNITY
Start: 2018-02-14

## 2023-11-28 RX ORDER — AMLODIPINE BESYLATE 10 MG/1
TAKE 1 TABLET DAILY TABLET ORAL
Refills: 0 | Status: ACTIVE | COMMUNITY

## 2023-11-28 RX ORDER — FINASTERIDE 5 MG/1
TABLET, FILM COATED ORAL
Qty: 90 | Refills: 0 | Status: ACTIVE | COMMUNITY
Start: 2017-11-29

## 2023-11-28 RX ORDER — DUTASTERIDE 0.5 MG
TAKE 1 CAPSULE DAILY CAPSULE ORAL
Refills: 0 | Status: ON HOLD | COMMUNITY

## 2023-11-28 RX ORDER — ALBUTEROL SULFATE 90 UG/1
AEROSOL, METERED RESPIRATORY (INHALATION)
Qty: 25 | Refills: 0 | Status: ON HOLD | COMMUNITY
Start: 2018-03-12

## 2023-11-28 RX ORDER — PANTOPRAZOLE SODIUM 40 MG/1
TAKE 1 TABLET DAILY TABLET, DELAYED RELEASE ORAL
OUTPATIENT
Start: 2018-03-12

## 2023-11-28 RX ORDER — METHOCARBAMOL 500 MG/1
TAKE 1 TABLET AS NEEDED TABLET, FILM COATED ORAL
Refills: 0 | Status: ON HOLD | COMMUNITY

## 2023-11-28 RX ORDER — MONTELUKAST SODIUM 10 MG/1
1 TABLET TABLET, FILM COATED ORAL ONCE A DAY
Status: ACTIVE | COMMUNITY

## 2023-11-28 RX ORDER — SILDENAFIL CITRATE 20 MG/1
TAKE 1 TABLET DAILY. PT STATES UNKNOWN DOSAGE TABLET ORAL
Refills: 0 | Status: ON HOLD | COMMUNITY

## 2023-11-28 RX ORDER — BENZOCAINE 20 G/100G
GEL, DENTIFRICE ORAL
Qty: 90 | Refills: 0 | Status: ON HOLD | COMMUNITY
Start: 2018-03-12

## 2023-11-28 NOTE — HPI-TODAY'S VISIT:
Mr. Persaud is a 77-year-old male with a history of hypertension, stage III chronic kidney disease, sleep apnea, thyroid cancer diagnosed s/p radiation therapy, prostate cancer, GERD, presenting for follow-up of EGD. 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 Esophageal biopsies revealed squamocolumnar mucosa with focal intestinal metaplasia and reflux esophagitis consistent with Petit's esophagus, no evidence of dysplasia or malignancy, gastric biopsies reveal foveolar hyperplasia with focal intestinal metaplasia, negative H. pylori. Overall he is doing fairly well.  His reflux is well controlled on pantoprazole in the morning and Pepcid at bedtime.  He is making dietary changes which has helped a lot.  He otherwise denies nausea, vomiting, dysphagia, abdominal pain, weight loss or change in bowel habits.  His thyroid cancer recently recurred seen on CT scan.  He completed radiation therapy again around July.  Colonoscopy revealed a normal terminal ileum, many diverticula in the sigmoid, descending, transverse and ascending colon, single small angiectasia in the transverse colon, otherwise normal colon and small internal hemorrhoids.  EGD on 5/19/2021 revealed an irregular Z-line at 40 cm, esophageal mucosal changes suspicious for short segment Petit's esophagus, gastroesophageal flap valve classified as Hill grade II (fold present, opens with respiration), gastric mucosal atrophy, and atrophic nonerosive gastritis. GEJ biopsy showed squamocolumnar mucosa without abnormality, negative for Petit's.  Gastric biopsy showed multifocal atrophic gastritis with focal intestinal metaplasia arising in a background of chronic inactive gastritis with changes suggestive of past H. pylori gastritis.  No H. pylori organisms were identified. CT of the abdomen and pelvis with contrast 4/28/2021 Mild wall thickening involving the pyloric region of the stomach.

## 2023-11-28 NOTE — HPI-OTHER HISTORIES
EGD 9/20/19 revealed an irregular Z line, [place]-colored mucosa from 39-40 cm, gastroesophageal flap valve classified as Hill grade II, small hiatal hernia, diffuse moderate gastritis and normal duodenum. GE junction biopsies revealed reflux type changes, negative for Petit's esophagus, gastric biopsies revealed multifocal atrophic gastritis with extensive complete intestinal metaplasia, negative for H. pylori or malignancy.  Colonoscopy (8/5/14) by Dr. Tapia revealed multiple diverticula in the entire colon and otherwise normal colon.

## 2024-06-13 ENCOUNTER — OFFICE VISIT (OUTPATIENT)
Dept: URBAN - METROPOLITAN AREA CLINIC 113 | Facility: CLINIC | Age: 79
End: 2024-06-13
Payer: MEDICARE

## 2024-06-13 VITALS
BODY MASS INDEX: 21.82 KG/M2 | SYSTOLIC BLOOD PRESSURE: 108 MMHG | DIASTOLIC BLOOD PRESSURE: 56 MMHG | RESPIRATION RATE: 18 BRPM | HEART RATE: 79 BPM | HEIGHT: 68 IN | WEIGHT: 144 LBS | TEMPERATURE: 98.1 F

## 2024-06-13 DIAGNOSIS — R11.2 NAUSEA AND VOMITING: ICD-10-CM

## 2024-06-13 DIAGNOSIS — K62.5 RECTAL BLEEDING: ICD-10-CM

## 2024-06-13 DIAGNOSIS — K21.9 GERD: ICD-10-CM

## 2024-06-13 DIAGNOSIS — D64.9 NORMOCYTIC ANEMIA: ICD-10-CM

## 2024-06-13 DIAGNOSIS — K55.20 ANGIODYSPLASIA OF COLON: ICD-10-CM

## 2024-06-13 DIAGNOSIS — K22.70 BARRETT'S ESOPHAGUS WITHOUT DYSPLASIA: ICD-10-CM

## 2024-06-13 DIAGNOSIS — K57.90 DIVERTICULOSIS: ICD-10-CM

## 2024-06-13 DIAGNOSIS — D61.818 PANCYTOPENIA: ICD-10-CM

## 2024-06-13 PROCEDURE — 99213 OFFICE O/P EST LOW 20 MIN: CPT | Performed by: INTERNAL MEDICINE

## 2024-06-13 RX ORDER — MELOXICAM 15 MG/1
TAKE 1 TABLET DAILY TABLET ORAL
Refills: 0 | Status: ON HOLD | COMMUNITY

## 2024-06-13 RX ORDER — AMOXICILLIN 500 MG/1
CAPSULE ORAL
Qty: 24 | Refills: 0 | Status: ON HOLD | COMMUNITY
Start: 2017-08-14

## 2024-06-13 RX ORDER — METOPROLOL TARTRATE 50 MG/1
TAKE 1 TABLET DAILY TABLET, FILM COATED ORAL
Refills: 0 | Status: ON HOLD | COMMUNITY

## 2024-06-13 RX ORDER — TADALAFIL 20 MG/1
1 TABLET TABLET, COATED ORAL ONCE A DAY
Status: ACTIVE | COMMUNITY

## 2024-06-13 RX ORDER — HYDROCODONE BITARTRATE AND ACETAMINOPHEN 10; 325 MG/1; MG/1
TABLET ORAL
Qty: 90 | Refills: 0 | Status: ON HOLD | COMMUNITY
Start: 2018-03-12

## 2024-06-13 RX ORDER — ALFUZOSIN HCL 10 MG
TAKE 1 TABLET DAILY TABLET, EXTENDED RELEASE 24 HR ORAL
Refills: 0 | Status: ON HOLD | COMMUNITY

## 2024-06-13 RX ORDER — AMLODIPINE BESYLATE 10 MG/1
TAKE 1 TABLET DAILY TABLET ORAL
Refills: 0 | Status: ACTIVE | COMMUNITY

## 2024-06-13 RX ORDER — ATORVASTATIN CALCIUM 20 MG/1
TAKE 1 TABLET DAILY TABLET, FILM COATED ORAL
Refills: 0 | Status: ACTIVE | COMMUNITY

## 2024-06-13 RX ORDER — LISINOPRIL 40 MG/1
TAKE 1 TABLET DAILY TABLET ORAL
Refills: 0 | Status: ACTIVE | COMMUNITY

## 2024-06-13 RX ORDER — OXYBUTYNIN 5 MG/1
TABLET, FILM COATED, EXTENDED RELEASE ORAL
Qty: 180 | Refills: 0 | Status: ON HOLD | COMMUNITY
Start: 2019-03-20

## 2024-06-13 RX ORDER — ALBUTEROL SULFATE 90 UG/1
AEROSOL, METERED RESPIRATORY (INHALATION)
Qty: 25 | Refills: 0 | Status: ON HOLD | COMMUNITY
Start: 2018-03-12

## 2024-06-13 RX ORDER — FAMOTIDINE 40 MG/1
TAKE 1 TABLET AT BEDTIME (NEED APPOINTMENT) TABLET ORAL
Qty: 90 TABLET | Refills: 3 | Status: ACTIVE | COMMUNITY

## 2024-06-13 RX ORDER — FLUTICASONE PROPIONATE 44 UG/1
AEROSOL, METERED RESPIRATORY (INHALATION)
Qty: 10 | Refills: 0 | Status: ON HOLD | COMMUNITY
Start: 2019-01-24

## 2024-06-13 RX ORDER — LEVOTHYROXINE SODIUM 137 UG/1
1 CAPSULE IN THE MORNING ON AN EMPTY STOMACH CAPSULE ORAL ONCE A DAY
Refills: 0 | Status: ACTIVE | COMMUNITY
Start: 2018-09-14

## 2024-06-13 RX ORDER — METHOCARBAMOL 500 MG/1
TAKE 1 TABLET AS NEEDED TABLET, FILM COATED ORAL
Refills: 0 | Status: ON HOLD | COMMUNITY

## 2024-06-13 RX ORDER — CETIRIZINE HYDROCHLORIDE 10 MG/1
TABLET, FILM COATED ORAL
Qty: 90 | Refills: 0 | Status: ON HOLD | COMMUNITY
Start: 2019-01-17

## 2024-06-13 RX ORDER — DOCUSATE SODIUM 100 MG/1
CAPSULE, LIQUID FILLED ORAL
Qty: 90 | Refills: 0 | Status: ON HOLD | COMMUNITY
Start: 2018-03-12

## 2024-06-13 RX ORDER — PHENAZOPYRIDINE HYDROCHLORIDE 100 MG/1
TABLET ORAL
Qty: 120 | Refills: 0 | Status: ON HOLD | COMMUNITY
Start: 2018-01-09

## 2024-06-13 RX ORDER — POLYETHYLENE GLYCOL 3350, SODIUM CHLORIDE, SODIUM BICARBONATE AND POTASSIUM CHLORIDE WITH LEMON FLAVOR 420; 11.2; 5.72; 1.48 G/4L; G/4L; G/4L; G/4L
MIX CONTENTS DAY PRIOR TO PROCEDURE, BEGIN DRINKING SOLUTION AT 5PM UNTIL HALF GONE. COMPLETE SOLUTION 6 HOURS PRIOR TO PROCEDURE POWDER, FOR SOLUTION ORAL
Qty: 1 | Refills: 0 | Status: ON HOLD | COMMUNITY
Start: 2019-01-18

## 2024-06-13 RX ORDER — MUPIROCIN 20 MG/G
OINTMENT TOPICAL
Qty: 22 | Refills: 0 | Status: ACTIVE | COMMUNITY
Start: 2018-03-12

## 2024-06-13 RX ORDER — LEVOTHYROXINE SODIUM 0.17 MG/1
TAKE 1 TABLET DAILY TABLET ORAL
Refills: 0 | Status: ON HOLD | COMMUNITY

## 2024-06-13 RX ORDER — DOCUSATE SODIUM 100 MG
TAKE 1 TABLET DAILY AS DIRECTED TABLET ORAL
Refills: 0 | Status: ACTIVE | COMMUNITY

## 2024-06-13 RX ORDER — MONTELUKAST SODIUM 10 MG/1
1 TABLET TABLET, FILM COATED ORAL ONCE A DAY
Status: ACTIVE | COMMUNITY

## 2024-06-13 RX ORDER — HYDROCHLOROTHIAZIDE 25 MG/1
TAKE 1 TABLET DAILY TABLET ORAL
Refills: 0 | Status: ACTIVE | COMMUNITY

## 2024-06-13 RX ORDER — PANTOPRAZOLE SODIUM 40 MG/1
TAKE 1 TABLET DAILY TABLET, DELAYED RELEASE ORAL
Status: ACTIVE | COMMUNITY
Start: 2018-03-12

## 2024-06-13 RX ORDER — SERTRALINE 100 MG/1
1 TABLET TABLET, FILM COATED ORAL ONCE A DAY
Status: ACTIVE | COMMUNITY

## 2024-06-13 RX ORDER — SILDENAFIL CITRATE 20 MG/1
TAKE 1 TABLET DAILY. PT STATES UNKNOWN DOSAGE TABLET ORAL
Refills: 0 | Status: ON HOLD | COMMUNITY

## 2024-06-13 RX ORDER — METHOCARBAMOL 500 MG/1
1 TABLET TABLET ORAL ONCE DAILY
Status: ACTIVE | COMMUNITY

## 2024-06-13 RX ORDER — PRAMIPEXOLE DIHYDROCHLORIDE 0.5 MG/1
TAKE 1 TABLET AT BEDTIME TABLET ORAL
Refills: 0 | Status: ON HOLD | COMMUNITY

## 2024-06-13 RX ORDER — ONDANSETRON 8 MG/1
TAKE 1 TABLET BEFORE MEALS TABLET ORAL
Qty: 90 | Refills: 2 | Status: ACTIVE | COMMUNITY

## 2024-06-13 RX ORDER — DUTASTERIDE 0.5 MG/1
1 CAPSULE CAPSULE, LIQUID FILLED ORAL ONCE A DAY
Status: ACTIVE | COMMUNITY

## 2024-06-13 RX ORDER — SIMVASTATIN 40 MG/1
TAKE 1 TABLET DAILY TABLET, FILM COATED ORAL
Refills: 0 | Status: ON HOLD | COMMUNITY

## 2024-06-13 RX ORDER — ACYCLOVIR 50 MG/G
OINTMENT TOPICAL
Qty: 15 | Refills: 0 | Status: ACTIVE | COMMUNITY
Start: 2019-09-25

## 2024-06-13 RX ORDER — PRAMIPEXOLE DIHYDROCHLORIDE 0.5 MG/1
1 TABLET TABLET ORAL ONCE A DAY
Status: ACTIVE | COMMUNITY

## 2024-06-13 RX ORDER — WHEAT DEXTRIN 3 G/4 G
TAKE 2 TSP DAILY. AS NEEDED POWDER (GRAM) ORAL
Refills: 0 | Status: ON HOLD | COMMUNITY

## 2024-06-13 RX ORDER — FINASTERIDE 5 MG/1
TABLET, FILM COATED ORAL
Qty: 90 | Refills: 0 | Status: ACTIVE | COMMUNITY
Start: 2017-11-29

## 2024-06-13 RX ORDER — DUTASTERIDE 0.5 MG
TAKE 1 CAPSULE DAILY CAPSULE ORAL
Refills: 0 | Status: ON HOLD | COMMUNITY

## 2024-06-13 RX ORDER — BENZOCAINE 20 G/100G
GEL, DENTIFRICE ORAL
Qty: 90 | Refills: 0 | Status: ON HOLD | COMMUNITY
Start: 2018-03-12

## 2024-06-13 RX ORDER — TADALAFIL 10 MG
TABLET ORAL
Qty: 15 | Refills: 0 | Status: ON HOLD | COMMUNITY
Start: 2018-02-14

## 2024-06-13 RX ORDER — FLUTICASONE PROPIONATE 50 UG/1
SPRAY, METERED NASAL
Qty: 16 | Refills: 0 | Status: ACTIVE | COMMUNITY
Start: 2019-10-29

## 2024-06-13 RX ORDER — TADALAFIL 20 MG/1
TAKE 1 TABLET DAILY 1 HOUR BEFORE NEEDED TABLET, FILM COATED ORAL
Refills: 0 | Status: ON HOLD | COMMUNITY
Start: 2018-06-14

## 2024-06-13 RX ORDER — HYDROCODONE BITARTRATE AND ACETAMINOPHEN 5; 325 MG/1; MG/1
TAKE 1 TABLET EVERY 4 TO 6 HOURS AS NEEDED FOR PAIN TABLET ORAL
Refills: 0 | Status: ON HOLD | COMMUNITY

## 2024-06-13 RX ORDER — GABAPENTIN 300 MG/1
TAKE 1 CAPSULE DAILY CAPSULE ORAL
Refills: 0 | Status: ACTIVE | COMMUNITY

## 2024-06-13 RX ORDER — SERTRALINE HCL 100 MG
TAKE 1 TABLET DAILY AS DIRECTED TABLET ORAL
Refills: 0 | Status: ON HOLD | COMMUNITY

## 2024-06-13 RX ORDER — TRAZODONE HYDROCHLORIDE 100 MG/1
2 TABLET AT BEDTIME TABLET ORAL ONCE A DAY
Status: ACTIVE | COMMUNITY

## 2024-06-13 NOTE — HPI-ZZZTODAY'S VISIT
78-year-old male presenting for follow-up.  He was last seen in November 2023.  He was previously followed by Dr. Shay Roland.  He does have a past medical history of hypertension, stage III chronic kidney disease, obstructive sleep apnea, thyroid cancer diagnosed status post radiation therapy, prostate cancer, GERD, and gastritis.  He last had an upper endoscopy performed in August 2023 which revealed a small hiatal hernia and nonerosive gastritis.  Esophageal biopsies were consistent with Petit's esophagus without any dysplasia.  Gastric biopsies were significant for intestinal metaplasia and negative for H. pylori.  He last had a colonoscopy performed in August 2024 by Dr. Cee.  It was overall normal.  He also had a colonoscopy performed by Dr. Roland on in August 2021 which was normal.  He was not recommended to repeat any further colonoscopies.  He does feel fairly well. He denies any current reflux. He feels denies any dyspahgia. He denies any regurgitation.

## 2024-07-22 ENCOUNTER — TELEPHONE ENCOUNTER (OUTPATIENT)
Dept: URBAN - METROPOLITAN AREA CLINIC 113 | Facility: CLINIC | Age: 79
End: 2024-07-22

## 2024-09-18 ENCOUNTER — CLAIMS CREATED FROM THE CLAIM WINDOW (OUTPATIENT)
Dept: URBAN - METROPOLITAN AREA CLINIC 4 | Facility: CLINIC | Age: 79
End: 2024-09-18
Payer: MEDICARE

## 2024-09-18 ENCOUNTER — OFFICE VISIT (OUTPATIENT)
Dept: URBAN - METROPOLITAN AREA SURGERY CENTER 25 | Facility: SURGERY CENTER | Age: 79
End: 2024-09-18
Payer: MEDICARE

## 2024-09-18 DIAGNOSIS — K21.9 GASTROESOPHAGEAL REFLUX DISEASE: ICD-10-CM

## 2024-09-18 DIAGNOSIS — K21.9 GASTRO-ESOPHAGEAL REFLUX DISEASE WITHOUT ESOPHAGITIS: ICD-10-CM

## 2024-09-18 PROCEDURE — 43239 EGD BIOPSY SINGLE/MULTIPLE: CPT | Performed by: INTERNAL MEDICINE

## 2024-09-18 PROCEDURE — 43239 EGD BIOPSY SINGLE/MULTIPLE: CPT | Performed by: CLINIC/CENTER

## 2024-09-18 PROCEDURE — 88305 TISSUE EXAM BY PATHOLOGIST: CPT | Performed by: PATHOLOGY

## 2024-09-18 PROCEDURE — 00731 ANES UPR GI NDSC PX NOS: CPT | Performed by: NURSE ANESTHETIST, CERTIFIED REGISTERED

## 2024-09-18 RX ORDER — MELOXICAM 15 MG/1
TAKE 1 TABLET DAILY TABLET ORAL
Refills: 0 | Status: ON HOLD | COMMUNITY

## 2024-09-18 RX ORDER — SERTRALINE HCL 100 MG
TAKE 1 TABLET DAILY AS DIRECTED TABLET ORAL
Refills: 0 | Status: ON HOLD | COMMUNITY

## 2024-09-18 RX ORDER — LISINOPRIL 40 MG/1
TAKE 1 TABLET DAILY TABLET ORAL
Refills: 0 | Status: ACTIVE | COMMUNITY

## 2024-09-18 RX ORDER — MONTELUKAST SODIUM 10 MG/1
1 TABLET TABLET, FILM COATED ORAL ONCE A DAY
Status: ACTIVE | COMMUNITY

## 2024-09-18 RX ORDER — HYDROCODONE BITARTRATE AND ACETAMINOPHEN 5; 325 MG/1; MG/1
TAKE 1 TABLET EVERY 4 TO 6 HOURS AS NEEDED FOR PAIN TABLET ORAL
Refills: 0 | Status: ON HOLD | COMMUNITY

## 2024-09-18 RX ORDER — SERTRALINE 100 MG/1
1 TABLET TABLET, FILM COATED ORAL ONCE A DAY
Status: ACTIVE | COMMUNITY

## 2024-09-18 RX ORDER — WHEAT DEXTRIN 3 G/4 G
TAKE 2 TSP DAILY. AS NEEDED POWDER (GRAM) ORAL
Refills: 0 | Status: ON HOLD | COMMUNITY

## 2024-09-18 RX ORDER — METHOCARBAMOL 500 MG/1
TAKE 1 TABLET AS NEEDED TABLET, FILM COATED ORAL
Refills: 0 | Status: ON HOLD | COMMUNITY

## 2024-09-18 RX ORDER — DOCUSATE SODIUM 100 MG/1
CAPSULE, LIQUID FILLED ORAL
Qty: 90 | Refills: 0 | Status: ON HOLD | COMMUNITY
Start: 2018-03-12

## 2024-09-18 RX ORDER — TADALAFIL 20 MG/1
1 TABLET TABLET, COATED ORAL ONCE A DAY
Status: ACTIVE | COMMUNITY

## 2024-09-18 RX ORDER — POLYETHYLENE GLYCOL 3350, SODIUM CHLORIDE, SODIUM BICARBONATE AND POTASSIUM CHLORIDE WITH LEMON FLAVOR 420; 11.2; 5.72; 1.48 G/4L; G/4L; G/4L; G/4L
MIX CONTENTS DAY PRIOR TO PROCEDURE, BEGIN DRINKING SOLUTION AT 5PM UNTIL HALF GONE. COMPLETE SOLUTION 6 HOURS PRIOR TO PROCEDURE POWDER, FOR SOLUTION ORAL
Qty: 1 | Refills: 0 | Status: ON HOLD | COMMUNITY
Start: 2019-01-18

## 2024-09-18 RX ORDER — FLUTICASONE PROPIONATE 44 UG/1
AEROSOL, METERED RESPIRATORY (INHALATION)
Qty: 10 | Refills: 0 | Status: ON HOLD | COMMUNITY
Start: 2019-01-24

## 2024-09-18 RX ORDER — TADALAFIL 20 MG/1
TAKE 1 TABLET DAILY 1 HOUR BEFORE NEEDED TABLET, FILM COATED ORAL
Refills: 0 | Status: ON HOLD | COMMUNITY
Start: 2018-06-14

## 2024-09-18 RX ORDER — PRAMIPEXOLE DIHYDROCHLORIDE 0.5 MG/1
1 TABLET TABLET ORAL ONCE A DAY
Status: ACTIVE | COMMUNITY

## 2024-09-18 RX ORDER — PHENAZOPYRIDINE HYDROCHLORIDE 100 MG/1
TABLET ORAL
Qty: 120 | Refills: 0 | Status: ON HOLD | COMMUNITY
Start: 2018-01-09

## 2024-09-18 RX ORDER — DUTASTERIDE 0.5 MG
TAKE 1 CAPSULE DAILY CAPSULE ORAL
Refills: 0 | Status: ON HOLD | COMMUNITY

## 2024-09-18 RX ORDER — DOCUSATE SODIUM 100 MG
TAKE 1 TABLET DAILY AS DIRECTED TABLET ORAL
Refills: 0 | Status: ACTIVE | COMMUNITY

## 2024-09-18 RX ORDER — SILDENAFIL CITRATE 20 MG/1
TAKE 1 TABLET DAILY. PT STATES UNKNOWN DOSAGE TABLET ORAL
Refills: 0 | Status: ON HOLD | COMMUNITY

## 2024-09-18 RX ORDER — ACYCLOVIR 50 MG/G
OINTMENT TOPICAL
Qty: 15 | Refills: 0 | Status: ACTIVE | COMMUNITY
Start: 2019-09-25

## 2024-09-18 RX ORDER — HYDROCODONE BITARTRATE AND ACETAMINOPHEN 10; 325 MG/1; MG/1
TABLET ORAL
Qty: 90 | Refills: 0 | Status: ON HOLD | COMMUNITY
Start: 2018-03-12

## 2024-09-18 RX ORDER — FAMOTIDINE 40 MG/1
TAKE 1 TABLET AT BEDTIME (NEED APPOINTMENT) TABLET ORAL
Qty: 90 TABLET | Refills: 3 | Status: ACTIVE | COMMUNITY

## 2024-09-18 RX ORDER — LEVOTHYROXINE SODIUM 137 UG/1
1 CAPSULE IN THE MORNING ON AN EMPTY STOMACH CAPSULE ORAL ONCE A DAY
Refills: 0 | Status: ACTIVE | COMMUNITY
Start: 2018-09-14

## 2024-09-18 RX ORDER — ALBUTEROL SULFATE 90 UG/1
AEROSOL, METERED RESPIRATORY (INHALATION)
Qty: 25 | Refills: 0 | Status: ON HOLD | COMMUNITY
Start: 2018-03-12

## 2024-09-18 RX ORDER — LEVOTHYROXINE SODIUM 0.17 MG/1
TAKE 1 TABLET DAILY TABLET ORAL
Refills: 0 | Status: ON HOLD | COMMUNITY

## 2024-09-18 RX ORDER — HYDROCHLOROTHIAZIDE 25 MG/1
TAKE 1 TABLET DAILY TABLET ORAL
Refills: 0 | Status: ACTIVE | COMMUNITY

## 2024-09-18 RX ORDER — OXYBUTYNIN 5 MG/1
TABLET, FILM COATED, EXTENDED RELEASE ORAL
Qty: 180 | Refills: 0 | Status: ON HOLD | COMMUNITY
Start: 2019-03-20

## 2024-09-18 RX ORDER — FINASTERIDE 5 MG/1
TABLET, FILM COATED ORAL
Qty: 90 | Refills: 0 | Status: ACTIVE | COMMUNITY
Start: 2017-11-29

## 2024-09-18 RX ORDER — PANTOPRAZOLE SODIUM 40 MG/1
TAKE 1 TABLET DAILY TABLET, DELAYED RELEASE ORAL
Status: ACTIVE | COMMUNITY
Start: 2018-03-12

## 2024-09-18 RX ORDER — GABAPENTIN 300 MG/1
TAKE 1 CAPSULE DAILY CAPSULE ORAL
Refills: 0 | Status: ACTIVE | COMMUNITY

## 2024-09-18 RX ORDER — TRAZODONE HYDROCHLORIDE 100 MG/1
2 TABLET AT BEDTIME TABLET ORAL ONCE A DAY
Status: ACTIVE | COMMUNITY

## 2024-09-18 RX ORDER — SIMVASTATIN 40 MG/1
TAKE 1 TABLET DAILY TABLET, FILM COATED ORAL
Refills: 0 | Status: ON HOLD | COMMUNITY

## 2024-09-18 RX ORDER — METHOCARBAMOL 500 MG/1
1 TABLET TABLET ORAL ONCE DAILY
Status: ACTIVE | COMMUNITY

## 2024-09-18 RX ORDER — METOPROLOL TARTRATE 50 MG/1
TAKE 1 TABLET DAILY TABLET, FILM COATED ORAL
Refills: 0 | Status: ON HOLD | COMMUNITY

## 2024-09-18 RX ORDER — BENZOCAINE 20 G/100G
GEL, DENTIFRICE ORAL
Qty: 90 | Refills: 0 | Status: ON HOLD | COMMUNITY
Start: 2018-03-12

## 2024-09-18 RX ORDER — ALFUZOSIN HCL 10 MG
TAKE 1 TABLET DAILY TABLET, EXTENDED RELEASE 24 HR ORAL
Refills: 0 | Status: ON HOLD | COMMUNITY

## 2024-09-18 RX ORDER — MUPIROCIN 20 MG/G
OINTMENT TOPICAL
Qty: 22 | Refills: 0 | Status: ACTIVE | COMMUNITY
Start: 2018-03-12

## 2024-09-18 RX ORDER — TADALAFIL 10 MG
TABLET ORAL
Qty: 15 | Refills: 0 | Status: ON HOLD | COMMUNITY
Start: 2018-02-14

## 2024-09-18 RX ORDER — AMLODIPINE BESYLATE 10 MG/1
TAKE 1 TABLET DAILY TABLET ORAL
Refills: 0 | Status: ACTIVE | COMMUNITY

## 2024-09-18 RX ORDER — AMOXICILLIN 500 MG/1
CAPSULE ORAL
Qty: 24 | Refills: 0 | Status: ON HOLD | COMMUNITY
Start: 2017-08-14

## 2024-09-18 RX ORDER — PRAMIPEXOLE DIHYDROCHLORIDE 0.5 MG/1
TAKE 1 TABLET AT BEDTIME TABLET ORAL
Refills: 0 | Status: ON HOLD | COMMUNITY

## 2024-09-18 RX ORDER — ONDANSETRON 8 MG/1
TAKE 1 TABLET BEFORE MEALS TABLET ORAL
Qty: 90 | Refills: 2 | Status: ACTIVE | COMMUNITY

## 2024-09-18 RX ORDER — DUTASTERIDE 0.5 MG/1
1 CAPSULE CAPSULE, LIQUID FILLED ORAL ONCE A DAY
Status: ACTIVE | COMMUNITY

## 2024-09-18 RX ORDER — ATORVASTATIN CALCIUM 20 MG/1
TAKE 1 TABLET DAILY TABLET, FILM COATED ORAL
Refills: 0 | Status: ACTIVE | COMMUNITY

## 2024-09-18 RX ORDER — CETIRIZINE HYDROCHLORIDE 10 MG/1
TABLET, FILM COATED ORAL
Qty: 90 | Refills: 0 | Status: ON HOLD | COMMUNITY
Start: 2019-01-17

## 2024-09-18 RX ORDER — FLUTICASONE PROPIONATE 50 UG/1
SPRAY, METERED NASAL
Qty: 16 | Refills: 0 | Status: ACTIVE | COMMUNITY
Start: 2019-10-29

## 2024-11-22 ENCOUNTER — OFFICE VISIT (OUTPATIENT)
Dept: URBAN - METROPOLITAN AREA CLINIC 113 | Facility: CLINIC | Age: 79
End: 2024-11-22
Payer: MEDICARE

## 2024-11-22 VITALS
HEIGHT: 68 IN | TEMPERATURE: 98.1 F | RESPIRATION RATE: 16 BRPM | WEIGHT: 147.2 LBS | DIASTOLIC BLOOD PRESSURE: 74 MMHG | SYSTOLIC BLOOD PRESSURE: 141 MMHG | HEART RATE: 83 BPM | BODY MASS INDEX: 22.31 KG/M2

## 2024-11-22 DIAGNOSIS — K22.70 BARRETT'S ESOPHAGUS WITHOUT DYSPLASIA: ICD-10-CM

## 2024-11-22 DIAGNOSIS — D61.818 PANCYTOPENIA: ICD-10-CM

## 2024-11-22 DIAGNOSIS — K62.5 RECTAL BLEEDING: ICD-10-CM

## 2024-11-22 DIAGNOSIS — K57.90 DIVERTICULOSIS: ICD-10-CM

## 2024-11-22 DIAGNOSIS — R11.2 NAUSEA AND VOMITING: ICD-10-CM

## 2024-11-22 DIAGNOSIS — D64.9 NORMOCYTIC ANEMIA: ICD-10-CM

## 2024-11-22 DIAGNOSIS — K21.9 GERD: ICD-10-CM

## 2024-11-22 DIAGNOSIS — R13.19 ESOPHAGEAL DYSPHAGIA: ICD-10-CM

## 2024-11-22 DIAGNOSIS — K55.20 ANGIODYSPLASIA OF COLON: ICD-10-CM

## 2024-11-22 PROCEDURE — 99214 OFFICE O/P EST MOD 30 MIN: CPT | Performed by: INTERNAL MEDICINE

## 2024-11-22 RX ORDER — SERTRALINE 100 MG/1
1 TABLET TABLET, FILM COATED ORAL ONCE A DAY
Status: ACTIVE | COMMUNITY

## 2024-11-22 RX ORDER — DUTASTERIDE 0.5 MG/1
1 CAPSULE CAPSULE, LIQUID FILLED ORAL ONCE A DAY
Status: ACTIVE | COMMUNITY

## 2024-11-22 RX ORDER — ATORVASTATIN CALCIUM 20 MG/1
TAKE 1 TABLET DAILY TABLET, FILM COATED ORAL
Refills: 0 | Status: ACTIVE | COMMUNITY

## 2024-11-22 RX ORDER — ALFUZOSIN HCL 10 MG
TAKE 1 TABLET DAILY TABLET, EXTENDED RELEASE 24 HR ORAL
Refills: 0 | Status: ON HOLD | COMMUNITY

## 2024-11-22 RX ORDER — FLUTICASONE PROPIONATE 44 UG/1
AEROSOL, METERED RESPIRATORY (INHALATION)
Qty: 10 | Refills: 0 | Status: ON HOLD | COMMUNITY
Start: 2019-01-24

## 2024-11-22 RX ORDER — FINASTERIDE 5 MG/1
TABLET, FILM COATED ORAL
Qty: 90 | Refills: 0 | Status: ACTIVE | COMMUNITY
Start: 2017-11-29

## 2024-11-22 RX ORDER — TRAZODONE HYDROCHLORIDE 100 MG/1
2 TABLET AT BEDTIME TABLET ORAL ONCE A DAY
Status: ACTIVE | COMMUNITY

## 2024-11-22 RX ORDER — ACYCLOVIR 50 MG/G
OINTMENT TOPICAL
Qty: 15 | Refills: 0 | Status: ACTIVE | COMMUNITY
Start: 2019-09-25

## 2024-11-22 RX ORDER — MONTELUKAST SODIUM 10 MG/1
1 TABLET TABLET, FILM COATED ORAL ONCE A DAY
Status: ACTIVE | COMMUNITY

## 2024-11-22 RX ORDER — BENZOCAINE 20 G/100G
GEL, DENTIFRICE ORAL
Qty: 90 | Refills: 0 | Status: ON HOLD | COMMUNITY
Start: 2018-03-12

## 2024-11-22 RX ORDER — AMLODIPINE BESYLATE 10 MG/1
TAKE 1 TABLET DAILY TABLET ORAL
Refills: 0 | Status: ACTIVE | COMMUNITY

## 2024-11-22 RX ORDER — DUTASTERIDE 0.5 MG
TAKE 1 CAPSULE DAILY CAPSULE ORAL
Refills: 0 | Status: ON HOLD | COMMUNITY

## 2024-11-22 RX ORDER — HYDROCODONE BITARTRATE AND ACETAMINOPHEN 5; 325 MG/1; MG/1
TAKE 1 TABLET EVERY 4 TO 6 HOURS AS NEEDED FOR PAIN TABLET ORAL
Refills: 0 | Status: ON HOLD | COMMUNITY

## 2024-11-22 RX ORDER — AMOXICILLIN 500 MG/1
CAPSULE ORAL
Qty: 24 | Refills: 0 | Status: ON HOLD | COMMUNITY
Start: 2017-08-14

## 2024-11-22 RX ORDER — DOCUSATE SODIUM 100 MG/1
CAPSULE, LIQUID FILLED ORAL
Qty: 90 | Refills: 0 | Status: ON HOLD | COMMUNITY
Start: 2018-03-12

## 2024-11-22 RX ORDER — SILDENAFIL CITRATE 20 MG/1
TAKE 1 TABLET DAILY. PT STATES UNKNOWN DOSAGE TABLET ORAL
Refills: 0 | Status: ON HOLD | COMMUNITY

## 2024-11-22 RX ORDER — POLYETHYLENE GLYCOL 3350, SODIUM CHLORIDE, SODIUM BICARBONATE AND POTASSIUM CHLORIDE WITH LEMON FLAVOR 420; 11.2; 5.72; 1.48 G/4L; G/4L; G/4L; G/4L
MIX CONTENTS DAY PRIOR TO PROCEDURE, BEGIN DRINKING SOLUTION AT 5PM UNTIL HALF GONE. COMPLETE SOLUTION 6 HOURS PRIOR TO PROCEDURE POWDER, FOR SOLUTION ORAL
Qty: 1 | Refills: 0 | Status: ON HOLD | COMMUNITY
Start: 2019-01-18

## 2024-11-22 RX ORDER — HYDROCHLOROTHIAZIDE 25 MG/1
TAKE 1 TABLET DAILY TABLET ORAL
Refills: 0 | Status: ACTIVE | COMMUNITY

## 2024-11-22 RX ORDER — LEVOTHYROXINE SODIUM 0.17 MG/1
TAKE 1 TABLET DAILY TABLET ORAL
Refills: 0 | Status: ON HOLD | COMMUNITY

## 2024-11-22 RX ORDER — HYDROCODONE BITARTRATE AND ACETAMINOPHEN 10; 325 MG/1; MG/1
TABLET ORAL
Qty: 90 | Refills: 0 | Status: ON HOLD | COMMUNITY
Start: 2018-03-12

## 2024-11-22 RX ORDER — MELOXICAM 15 MG/1
TAKE 1 TABLET DAILY TABLET ORAL
Refills: 0 | Status: ON HOLD | COMMUNITY

## 2024-11-22 RX ORDER — METHOCARBAMOL 500 MG/1
TAKE 1 TABLET AS NEEDED TABLET, FILM COATED ORAL
Refills: 0 | Status: ON HOLD | COMMUNITY

## 2024-11-22 RX ORDER — OXYBUTYNIN 5 MG/1
TABLET, FILM COATED, EXTENDED RELEASE ORAL
Qty: 180 | Refills: 0 | Status: ON HOLD | COMMUNITY
Start: 2019-03-20

## 2024-11-22 RX ORDER — TADALAFIL 20 MG/1
1 TABLET TABLET, COATED ORAL ONCE A DAY
Status: ACTIVE | COMMUNITY

## 2024-11-22 RX ORDER — FLUTICASONE PROPIONATE 50 UG/1
SPRAY, METERED NASAL
Qty: 16 | Refills: 0 | Status: ACTIVE | COMMUNITY
Start: 2019-10-29

## 2024-11-22 RX ORDER — PRAMIPEXOLE DIHYDROCHLORIDE 0.5 MG/1
TAKE 1 TABLET AT BEDTIME TABLET ORAL
Refills: 0 | Status: ON HOLD | COMMUNITY

## 2024-11-22 RX ORDER — ALBUTEROL SULFATE 90 UG/1
AEROSOL, METERED RESPIRATORY (INHALATION)
Qty: 25 | Refills: 0 | Status: ON HOLD | COMMUNITY
Start: 2018-03-12

## 2024-11-22 RX ORDER — GABAPENTIN 300 MG/1
TAKE 1 CAPSULE DAILY CAPSULE ORAL
Refills: 0 | Status: ACTIVE | COMMUNITY

## 2024-11-22 RX ORDER — CETIRIZINE HYDROCHLORIDE 10 MG/1
TABLET, FILM COATED ORAL
Qty: 90 | Refills: 0 | Status: ON HOLD | COMMUNITY
Start: 2019-01-17

## 2024-11-22 RX ORDER — SERTRALINE HCL 100 MG
TAKE 1 TABLET DAILY AS DIRECTED TABLET ORAL
Refills: 0 | Status: ON HOLD | COMMUNITY

## 2024-11-22 RX ORDER — WHEAT DEXTRIN 3 G/4 G
TAKE 2 TSP DAILY. AS NEEDED POWDER (GRAM) ORAL
Refills: 0 | Status: ON HOLD | COMMUNITY

## 2024-11-22 RX ORDER — TADALAFIL 10 MG
TABLET ORAL
Qty: 15 | Refills: 0 | Status: ON HOLD | COMMUNITY
Start: 2018-02-14

## 2024-11-22 RX ORDER — LISINOPRIL 40 MG/1
TAKE 1 TABLET DAILY TABLET ORAL
Refills: 0 | Status: ACTIVE | COMMUNITY

## 2024-11-22 RX ORDER — METHOCARBAMOL 500 MG/1
1 TABLET TABLET ORAL ONCE DAILY
Status: ACTIVE | COMMUNITY

## 2024-11-22 RX ORDER — SIMVASTATIN 40 MG/1
TAKE 1 TABLET DAILY TABLET, FILM COATED ORAL
Refills: 0 | Status: ON HOLD | COMMUNITY

## 2024-11-22 RX ORDER — PANTOPRAZOLE SODIUM 40 MG/1
TAKE 1 TABLET DAILY TABLET, DELAYED RELEASE ORAL
Status: ACTIVE | COMMUNITY
Start: 2018-03-12

## 2024-11-22 RX ORDER — PHENAZOPYRIDINE HYDROCHLORIDE 100 MG/1
TABLET ORAL
Qty: 120 | Refills: 0 | Status: ON HOLD | COMMUNITY
Start: 2018-01-09

## 2024-11-22 RX ORDER — PRAMIPEXOLE DIHYDROCHLORIDE 0.5 MG/1
1 TABLET TABLET ORAL ONCE A DAY
Status: ACTIVE | COMMUNITY

## 2024-11-22 RX ORDER — ONDANSETRON 8 MG/1
TAKE 1 TABLET BEFORE MEALS TABLET ORAL
Qty: 90 | Refills: 2 | Status: ACTIVE | COMMUNITY

## 2024-11-22 RX ORDER — MUPIROCIN 20 MG/G
OINTMENT TOPICAL
Qty: 22 | Refills: 0 | Status: ACTIVE | COMMUNITY
Start: 2018-03-12

## 2024-11-22 RX ORDER — TADALAFIL 20 MG/1
TAKE 1 TABLET DAILY 1 HOUR BEFORE NEEDED TABLET, FILM COATED ORAL
Refills: 0 | Status: ON HOLD | COMMUNITY
Start: 2018-06-14

## 2024-11-22 RX ORDER — DOCUSATE SODIUM 100 MG
TAKE 1 TABLET DAILY AS DIRECTED TABLET ORAL
Refills: 0 | Status: ACTIVE | COMMUNITY

## 2024-11-22 RX ORDER — FAMOTIDINE 40 MG/1
TAKE 1 TABLET AT BEDTIME (NEED APPOINTMENT) TABLET ORAL
Qty: 90 TABLET | Refills: 3 | Status: ACTIVE | COMMUNITY

## 2024-11-22 RX ORDER — LEVOTHYROXINE SODIUM 125 UG/1
1 CAPSULE IN THE MORNING ON AN EMPTY STOMACH CAPSULE ORAL ONCE A DAY
Refills: 0 | Status: ACTIVE | COMMUNITY
Start: 2018-09-14

## 2024-11-22 RX ORDER — METOPROLOL TARTRATE 50 MG/1
TAKE 1 TABLET DAILY TABLET, FILM COATED ORAL
Refills: 0 | Status: ON HOLD | COMMUNITY

## 2024-11-22 NOTE — PHYSICAL EXAM CHEST:
chest wall non-tender, breathing is unlabored without accessory muscle use, normal breath sounds
18-Dec-2021 04:05

## 2024-11-22 NOTE — HPI-ZZZTODAY'S VISIT
78-year-old male presenting for follow-up.  He was last seen in June 2020 for for colon cancer screening follow-up.  He did have a normal colonoscopy in 2021 and was not due for a repeat colonoscopy.  He did reach back out to our clinic in July 2020 for with a primary complaint of increase dysphagia.  This was a new symptom and he did have a prior history of an esophageal web.  We did proceed with an upper endoscopy which was normal.  Biopsies were also unremarkable aside from reflux type changes.  He is here today for follow-up.  He was given samples of both [place]. Patient is without any abdominal complaints. No dysphagia, heartburn, regurgitation, unintentional weight loss, nausea, vomiting, hematemesis or melena. Bowels are moving regularly without blood per rectum. No complaints of bloating. No jaundice, icterus.

## 2025-06-04 ENCOUNTER — OFFICE VISIT (OUTPATIENT)
Dept: URBAN - METROPOLITAN AREA CLINIC 113 | Facility: CLINIC | Age: 80
End: 2025-06-04
Payer: MEDICARE

## 2025-06-04 DIAGNOSIS — K21.9 GERD: ICD-10-CM

## 2025-06-04 DIAGNOSIS — D64.9 NORMOCYTIC ANEMIA: ICD-10-CM

## 2025-06-04 DIAGNOSIS — K62.5 RECTAL BLEEDING: ICD-10-CM

## 2025-06-04 DIAGNOSIS — R13.19 ESOPHAGEAL DYSPHAGIA: ICD-10-CM

## 2025-06-04 DIAGNOSIS — R11.2 NAUSEA AND VOMITING: ICD-10-CM

## 2025-06-04 DIAGNOSIS — D61.818 PANCYTOPENIA: ICD-10-CM

## 2025-06-04 DIAGNOSIS — K55.20 ANGIODYSPLASIA OF COLON: ICD-10-CM

## 2025-06-04 DIAGNOSIS — K22.70 BARRETT'S ESOPHAGUS WITHOUT DYSPLASIA: ICD-10-CM

## 2025-06-04 DIAGNOSIS — K57.90 DIVERTICULOSIS: ICD-10-CM

## 2025-06-04 PROCEDURE — 99214 OFFICE O/P EST MOD 30 MIN: CPT

## 2025-06-04 RX ORDER — ALBUTEROL SULFATE 90 UG/1
AEROSOL, METERED RESPIRATORY (INHALATION)
Qty: 25 | Refills: 0 | Status: ON HOLD | COMMUNITY
Start: 2018-03-12

## 2025-06-04 RX ORDER — HYDROCODONE BITARTRATE AND ACETAMINOPHEN 5; 325 MG/1; MG/1
TAKE 1 TABLET EVERY 4 TO 6 HOURS AS NEEDED FOR PAIN TABLET ORAL
Refills: 0 | Status: ON HOLD | COMMUNITY

## 2025-06-04 RX ORDER — SIMVASTATIN 40 MG/1
TAKE 1 TABLET DAILY TABLET, FILM COATED ORAL
Refills: 0 | Status: ON HOLD | COMMUNITY

## 2025-06-04 RX ORDER — SERTRALINE 100 MG/1
1 TABLET TABLET, FILM COATED ORAL ONCE A DAY
Status: ACTIVE | COMMUNITY

## 2025-06-04 RX ORDER — LEVOTHYROXINE SODIUM 125 UG/1
1 CAPSULE IN THE MORNING ON AN EMPTY STOMACH CAPSULE ORAL ONCE A DAY
Refills: 0 | Status: ACTIVE | COMMUNITY
Start: 2018-09-14

## 2025-06-04 RX ORDER — ACYCLOVIR 50 MG/G
OINTMENT TOPICAL
Qty: 15 | Refills: 0 | Status: ACTIVE | COMMUNITY
Start: 2019-09-25

## 2025-06-04 RX ORDER — DOCUSATE SODIUM 100 MG/1
CAPSULE, LIQUID FILLED ORAL
Qty: 90 | Refills: 0 | Status: ON HOLD | COMMUNITY
Start: 2018-03-12

## 2025-06-04 RX ORDER — FAMOTIDINE 40 MG/1
TAKE 1 TABLET AT BEDTIME (NEED APPOINTMENT) TABLET ORAL
Qty: 90 TABLET | Refills: 3 | Status: ACTIVE | COMMUNITY

## 2025-06-04 RX ORDER — ALFUZOSIN HCL 10 MG
TAKE 1 TABLET DAILY TABLET, EXTENDED RELEASE 24 HR ORAL
Refills: 0 | Status: ON HOLD | COMMUNITY

## 2025-06-04 RX ORDER — MELOXICAM 15 MG/1
TAKE 1 TABLET DAILY TABLET ORAL
Refills: 0 | Status: ON HOLD | COMMUNITY

## 2025-06-04 RX ORDER — MONTELUKAST SODIUM 10 MG/1
1 TABLET TABLET, FILM COATED ORAL ONCE A DAY
Status: ACTIVE | COMMUNITY

## 2025-06-04 RX ORDER — HYDROCHLOROTHIAZIDE 25 MG/1
TAKE 1 TABLET DAILY TABLET ORAL
Refills: 0 | Status: ACTIVE | COMMUNITY

## 2025-06-04 RX ORDER — ATORVASTATIN CALCIUM 20 MG/1
TAKE 1 TABLET DAILY TABLET, FILM COATED ORAL
Refills: 0 | Status: ACTIVE | COMMUNITY

## 2025-06-04 RX ORDER — TADALAFIL 20 MG/1
1 TABLET TABLET, COATED ORAL ONCE A DAY
Status: ACTIVE | COMMUNITY

## 2025-06-04 RX ORDER — AMLODIPINE BESYLATE 10 MG/1
TAKE 1 TABLET DAILY TABLET ORAL
Refills: 0 | Status: ACTIVE | COMMUNITY

## 2025-06-04 RX ORDER — SERTRALINE HYDROCHLORIDE 100 MG/1
TAKE 1 TABLET DAILY AS DIRECTED TABLET, FILM COATED ORAL
Refills: 0 | Status: ON HOLD | COMMUNITY

## 2025-06-04 RX ORDER — LEVOTHYROXINE SODIUM 0.17 MG/1
TAKE 1 TABLET DAILY TABLET ORAL
Refills: 0 | Status: ON HOLD | COMMUNITY

## 2025-06-04 RX ORDER — DUTASTERIDE 0.5 MG/1
1 CAPSULE CAPSULE, LIQUID FILLED ORAL ONCE A DAY
Status: ACTIVE | COMMUNITY

## 2025-06-04 RX ORDER — HYDROCODONE BITARTRATE AND ACETAMINOPHEN 10; 325 MG/1; MG/1
TABLET ORAL
Qty: 90 | Refills: 0 | Status: ON HOLD | COMMUNITY
Start: 2018-03-12

## 2025-06-04 RX ORDER — METHOCARBAMOL 500 MG/1
TAKE 1 TABLET AS NEEDED TABLET, FILM COATED ORAL
Refills: 0 | Status: ON HOLD | COMMUNITY

## 2025-06-04 RX ORDER — METOPROLOL TARTRATE 50 MG/1
TAKE 1 TABLET DAILY TABLET, FILM COATED ORAL
Refills: 0 | Status: ON HOLD | COMMUNITY

## 2025-06-04 RX ORDER — TRAZODONE HYDROCHLORIDE 100 MG/1
2 TABLET AT BEDTIME TABLET ORAL ONCE A DAY
Status: ACTIVE | COMMUNITY

## 2025-06-04 RX ORDER — PRAMIPEXOLE DIHYDROCHLORIDE 0.5 MG/1
1 TABLET TABLET ORAL ONCE A DAY
Status: ACTIVE | COMMUNITY

## 2025-06-04 RX ORDER — GABAPENTIN 300 MG/1
TAKE 1 CAPSULE DAILY CAPSULE ORAL
Refills: 0 | Status: ACTIVE | COMMUNITY

## 2025-06-04 RX ORDER — OXYBUTYNIN 5 MG/1
TABLET, FILM COATED, EXTENDED RELEASE ORAL
Qty: 180 | Refills: 0 | Status: ON HOLD | COMMUNITY
Start: 2019-03-20

## 2025-06-04 RX ORDER — LISINOPRIL 40 MG/1
TAKE 1 TABLET DAILY TABLET ORAL
Refills: 0 | Status: ACTIVE | COMMUNITY

## 2025-06-04 RX ORDER — PHENAZOPYRIDINE HYDROCHLORIDE 100 MG/1
TABLET ORAL
Qty: 120 | Refills: 0 | Status: ON HOLD | COMMUNITY
Start: 2018-01-09

## 2025-06-04 RX ORDER — CETIRIZINE HYDROCHLORIDE 10 MG/1
TABLET, FILM COATED ORAL
Qty: 90 | Refills: 0 | Status: ON HOLD | COMMUNITY
Start: 2019-01-17

## 2025-06-04 RX ORDER — PRAMIPEXOLE DIHYDROCHLORIDE 0.5 MG/1
TAKE 1 TABLET AT BEDTIME TABLET ORAL
Refills: 0 | Status: ON HOLD | COMMUNITY

## 2025-06-04 RX ORDER — FLUTICASONE PROPIONATE 50 UG/1
SPRAY, METERED NASAL
Qty: 16 | Refills: 0 | Status: ACTIVE | COMMUNITY
Start: 2019-10-29

## 2025-06-04 RX ORDER — PANTOPRAZOLE SODIUM 40 MG/1
1 TABLET TABLET, DELAYED RELEASE ORAL TWICE DAILY
Qty: 180 | Refills: 1 | OUTPATIENT
Start: 2025-06-04

## 2025-06-04 RX ORDER — FLUTICASONE PROPIONATE 44 UG/1
AEROSOL, METERED RESPIRATORY (INHALATION)
Qty: 10 | Refills: 0 | Status: ON HOLD | COMMUNITY
Start: 2019-01-24

## 2025-06-04 RX ORDER — PANTOPRAZOLE SODIUM 40 MG/1
TAKE 1 TABLET DAILY TABLET, DELAYED RELEASE ORAL
Status: ACTIVE | COMMUNITY
Start: 2018-03-12

## 2025-06-04 RX ORDER — FINASTERIDE 5 MG/1
TABLET, FILM COATED ORAL
Qty: 90 | Refills: 0 | Status: ACTIVE | COMMUNITY
Start: 2017-11-29

## 2025-06-04 RX ORDER — METHOCARBAMOL 500 MG/1
1 TABLET TABLET ORAL ONCE DAILY
Status: ACTIVE | COMMUNITY

## 2025-06-04 RX ORDER — AMOXICILLIN 500 MG/1
CAPSULE ORAL
Qty: 24 | Refills: 0 | Status: ON HOLD | COMMUNITY
Start: 2017-08-14

## 2025-06-04 RX ORDER — SILDENAFIL CITRATE 20 MG/1
TAKE 1 TABLET DAILY. PT STATES UNKNOWN DOSAGE TABLET ORAL
Refills: 0 | Status: ON HOLD | COMMUNITY

## 2025-06-04 RX ORDER — DOCUSATE SODIUM 100 MG
TAKE 1 TABLET DAILY AS DIRECTED TABLET ORAL
Refills: 0 | Status: ACTIVE | COMMUNITY

## 2025-06-04 RX ORDER — WHEAT DEXTRIN 3 G/4 G
TAKE 2 TSP DAILY. AS NEEDED POWDER (GRAM) ORAL
Refills: 0 | Status: ON HOLD | COMMUNITY

## 2025-06-04 RX ORDER — POLYETHYLENE GLYCOL 3350, SODIUM CHLORIDE, SODIUM BICARBONATE AND POTASSIUM CHLORIDE WITH LEMON FLAVOR 420; 11.2; 5.72; 1.48 G/4L; G/4L; G/4L; G/4L
MIX CONTENTS DAY PRIOR TO PROCEDURE, BEGIN DRINKING SOLUTION AT 5PM UNTIL HALF GONE. COMPLETE SOLUTION 6 HOURS PRIOR TO PROCEDURE POWDER, FOR SOLUTION ORAL
Qty: 1 | Refills: 0 | Status: ON HOLD | COMMUNITY
Start: 2019-01-18

## 2025-06-04 RX ORDER — ONDANSETRON HYDROCHLORIDE 8 MG/1
TAKE 1 TABLET BEFORE MEALS TABLET, FILM COATED ORAL
Qty: 90 | Refills: 2 | Status: ACTIVE | COMMUNITY

## 2025-06-04 RX ORDER — MUPIROCIN 20 MG/G
OINTMENT TOPICAL
Qty: 22 | Refills: 0 | Status: ACTIVE | COMMUNITY
Start: 2018-03-12

## 2025-06-04 RX ORDER — BENZOCAINE 20 G/100G
GEL, DENTIFRICE ORAL
Qty: 90 | Refills: 0 | Status: ON HOLD | COMMUNITY
Start: 2018-03-12

## 2025-06-04 RX ORDER — DUTASTERIDE 0.5 MG
TAKE 1 CAPSULE DAILY CAPSULE ORAL
Refills: 0 | Status: ON HOLD | COMMUNITY

## 2025-06-04 RX ORDER — TADALAFIL 20 MG/1
TAKE 1 TABLET DAILY 1 HOUR BEFORE NEEDED TABLET, FILM COATED ORAL
Refills: 0 | Status: ON HOLD | COMMUNITY
Start: 2018-06-14

## 2025-06-04 RX ORDER — TADALAFIL 10 MG
TABLET ORAL
Qty: 15 | Refills: 0 | Status: ON HOLD | COMMUNITY
Start: 2018-02-14

## 2025-06-04 NOTE — HPI-TODAY'S VISIT:
79-year-old male presents for follow-up.  He was last seen November 2024.  He has reflux with evidence of gastritis and reflux on EGD.  We would consider repeat EGD in 5 years given his prior history of Petit's esophagus though probably not necessary due to his age.  Given his advanced age he was also not recommended any further screening.  He was to continue daily Benefiber and Protonix.  Labs in April 2025 show hemoglobin 11.4, hematocrit 32.8, MCV 86.8, normal LFTs, GFR 24, creatinine 2.7, BUN 50.  He has recently been recommended a Myoview workup for his shortness of breath due to risk factors of coronary artery disease.  CT angiogram completed in April 2025 showed no evidence of a pulmonary embolism.  He is undergoing a workup with Dr. Croft, cardiology. He is experiencing voice hoarseness for 5-6 months. 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 THis was in 2006. He does experience throat clearing and a dry cough on occasion. He does admit to globus sensation. Bowel movements are regular. He had prostate cancer and underwent XRT.

## 2025-08-05 ENCOUNTER — OFFICE VISIT (OUTPATIENT)
Dept: URBAN - METROPOLITAN AREA CLINIC 113 | Facility: CLINIC | Age: 80
End: 2025-08-05
Payer: MEDICARE

## 2025-08-05 DIAGNOSIS — D61.818 PANCYTOPENIA: ICD-10-CM

## 2025-08-05 DIAGNOSIS — K62.5 RECTAL BLEEDING: ICD-10-CM

## 2025-08-05 DIAGNOSIS — R13.19 ESOPHAGEAL DYSPHAGIA: ICD-10-CM

## 2025-08-05 DIAGNOSIS — R11.2 NAUSEA AND VOMITING: ICD-10-CM

## 2025-08-05 DIAGNOSIS — K55.20 ANGIODYSPLASIA OF COLON: ICD-10-CM

## 2025-08-05 DIAGNOSIS — K22.70 BARRETT'S ESOPHAGUS WITHOUT DYSPLASIA: ICD-10-CM

## 2025-08-05 DIAGNOSIS — D64.9 NORMOCYTIC ANEMIA: ICD-10-CM

## 2025-08-05 DIAGNOSIS — K57.90 DIVERTICULOSIS: ICD-10-CM

## 2025-08-05 DIAGNOSIS — K21.9 GERD: ICD-10-CM

## 2025-08-05 PROCEDURE — 99213 OFFICE O/P EST LOW 20 MIN: CPT | Performed by: INTERNAL MEDICINE

## 2025-08-05 RX ORDER — CETIRIZINE HYDROCHLORIDE 10 MG/1
TABLET, FILM COATED ORAL
Qty: 90 | Refills: 0 | Status: ON HOLD | COMMUNITY
Start: 2019-01-17

## 2025-08-05 RX ORDER — PRAMIPEXOLE DIHYDROCHLORIDE 0.5 MG/1
1 TABLET TABLET ORAL ONCE A DAY
Status: ACTIVE | COMMUNITY

## 2025-08-05 RX ORDER — BENZOCAINE 20 G/100G
GEL, DENTIFRICE ORAL
Qty: 90 | Refills: 0 | Status: ON HOLD | COMMUNITY
Start: 2018-03-12

## 2025-08-05 RX ORDER — DOCUSATE SODIUM 100 MG
TAKE 1 TABLET DAILY AS DIRECTED TABLET ORAL
Refills: 0 | Status: ACTIVE | COMMUNITY

## 2025-08-05 RX ORDER — SERTRALINE 100 MG/1
1 TABLET TABLET, FILM COATED ORAL ONCE A DAY
Status: ACTIVE | COMMUNITY

## 2025-08-05 RX ORDER — PANTOPRAZOLE SODIUM 40 MG/1
1 TABLET TABLET, DELAYED RELEASE ORAL TWICE DAILY
Qty: 180 | Refills: 1 | Status: ACTIVE | COMMUNITY
Start: 2025-06-04

## 2025-08-05 RX ORDER — TRAZODONE HYDROCHLORIDE 100 MG/1
2 TABLET AT BEDTIME TABLET ORAL ONCE A DAY
Status: ACTIVE | COMMUNITY

## 2025-08-05 RX ORDER — FLUTICASONE PROPIONATE 50 UG/1
SPRAY, METERED NASAL
Qty: 16 | Refills: 0 | Status: ACTIVE | COMMUNITY
Start: 2019-10-29

## 2025-08-05 RX ORDER — HYDROCODONE BITARTRATE AND ACETAMINOPHEN 10; 325 MG/1; MG/1
TABLET ORAL
Qty: 90 | Refills: 0 | Status: ON HOLD | COMMUNITY
Start: 2018-03-12

## 2025-08-05 RX ORDER — DOCUSATE SODIUM 100 MG/1
CAPSULE, LIQUID FILLED ORAL
Qty: 90 | Refills: 0 | Status: ON HOLD | COMMUNITY
Start: 2018-03-12

## 2025-08-05 RX ORDER — ACYCLOVIR 50 MG/G
OINTMENT TOPICAL
Qty: 15 | Refills: 0 | Status: ACTIVE | COMMUNITY
Start: 2019-09-25

## 2025-08-05 RX ORDER — MELOXICAM 15 MG/1
TAKE 1 TABLET DAILY TABLET ORAL
Refills: 0 | Status: ON HOLD | COMMUNITY

## 2025-08-05 RX ORDER — METOPROLOL TARTRATE 50 MG/1
TAKE 1 TABLET DAILY TABLET, FILM COATED ORAL
Refills: 0 | Status: ON HOLD | COMMUNITY

## 2025-08-05 RX ORDER — FAMOTIDINE 40 MG/1
TAKE 1 TABLET AT BEDTIME (NEED APPOINTMENT) TABLET ORAL
Qty: 90 TABLET | Refills: 3 | Status: ACTIVE | COMMUNITY

## 2025-08-05 RX ORDER — SERTRALINE HYDROCHLORIDE 100 MG/1
TAKE 1 TABLET DAILY AS DIRECTED TABLET, FILM COATED ORAL
Refills: 0 | Status: ON HOLD | COMMUNITY

## 2025-08-05 RX ORDER — WHEAT DEXTRIN 3 G/4 G
TAKE 2 TSP DAILY. AS NEEDED POWDER (GRAM) ORAL
Refills: 0 | Status: ON HOLD | COMMUNITY

## 2025-08-05 RX ORDER — AMLODIPINE BESYLATE 10 MG/1
TAKE 1 TABLET DAILY TABLET ORAL
Refills: 0 | Status: ACTIVE | COMMUNITY

## 2025-08-05 RX ORDER — SILDENAFIL CITRATE 20 MG/1
TAKE 1 TABLET DAILY. PT STATES UNKNOWN DOSAGE TABLET ORAL
Refills: 0 | Status: ON HOLD | COMMUNITY

## 2025-08-05 RX ORDER — LISINOPRIL 40 MG/1
TAKE 1 TABLET DAILY TABLET ORAL
Refills: 0 | Status: ACTIVE | COMMUNITY

## 2025-08-05 RX ORDER — FLUTICASONE PROPIONATE 44 UG/1
AEROSOL, METERED RESPIRATORY (INHALATION)
Qty: 10 | Refills: 0 | Status: ON HOLD | COMMUNITY
Start: 2019-01-24

## 2025-08-05 RX ORDER — METHOCARBAMOL 500 MG/1
1 TABLET TABLET ORAL ONCE DAILY
Status: ACTIVE | COMMUNITY

## 2025-08-05 RX ORDER — HYDROCODONE BITARTRATE AND ACETAMINOPHEN 5; 325 MG/1; MG/1
TAKE 1 TABLET EVERY 4 TO 6 HOURS AS NEEDED FOR PAIN TABLET ORAL
Refills: 0 | Status: ON HOLD | COMMUNITY

## 2025-08-05 RX ORDER — ALFUZOSIN HCL 10 MG
TAKE 1 TABLET DAILY TABLET, EXTENDED RELEASE 24 HR ORAL
Refills: 0 | Status: ON HOLD | COMMUNITY

## 2025-08-05 RX ORDER — DUTASTERIDE 0.5 MG/1
1 CAPSULE CAPSULE, LIQUID FILLED ORAL ONCE A DAY
Status: ACTIVE | COMMUNITY

## 2025-08-05 RX ORDER — FINASTERIDE 5 MG/1
TABLET, FILM COATED ORAL
Qty: 90 | Refills: 0 | Status: ACTIVE | COMMUNITY
Start: 2017-11-29

## 2025-08-05 RX ORDER — PRAMIPEXOLE DIHYDROCHLORIDE 0.5 MG/1
TAKE 1 TABLET AT BEDTIME TABLET ORAL
Refills: 0 | Status: ON HOLD | COMMUNITY

## 2025-08-05 RX ORDER — ALBUTEROL SULFATE 90 UG/1
AEROSOL, METERED RESPIRATORY (INHALATION)
Qty: 25 | Refills: 0 | Status: ON HOLD | COMMUNITY
Start: 2018-03-12

## 2025-08-05 RX ORDER — HYDROCHLOROTHIAZIDE 25 MG/1
TAKE 1 TABLET DAILY TABLET ORAL
Refills: 0 | Status: ACTIVE | COMMUNITY

## 2025-08-05 RX ORDER — MUPIROCIN 20 MG/G
OINTMENT TOPICAL
Qty: 22 | Refills: 0 | Status: ACTIVE | COMMUNITY
Start: 2018-03-12

## 2025-08-05 RX ORDER — PANTOPRAZOLE SODIUM 40 MG/1
TAKE 1 TABLET DAILY TABLET, DELAYED RELEASE ORAL
Status: ACTIVE | COMMUNITY
Start: 2018-03-12

## 2025-08-05 RX ORDER — SIMVASTATIN 40 MG/1
TAKE 1 TABLET DAILY TABLET, FILM COATED ORAL
Refills: 0 | Status: ON HOLD | COMMUNITY

## 2025-08-05 RX ORDER — ONDANSETRON HYDROCHLORIDE 8 MG/1
TAKE 1 TABLET BEFORE MEALS TABLET, FILM COATED ORAL
Qty: 90 | Refills: 2 | Status: ACTIVE | COMMUNITY

## 2025-08-05 RX ORDER — OXYBUTYNIN 5 MG/1
TABLET, FILM COATED, EXTENDED RELEASE ORAL
Qty: 180 | Refills: 0 | Status: ON HOLD | COMMUNITY
Start: 2019-03-20

## 2025-08-05 RX ORDER — GABAPENTIN 300 MG/1
TAKE 1 CAPSULE DAILY CAPSULE ORAL
Refills: 0 | Status: ACTIVE | COMMUNITY

## 2025-08-05 RX ORDER — PHENAZOPYRIDINE HYDROCHLORIDE 100 MG/1
TABLET ORAL
Qty: 120 | Refills: 0 | Status: ON HOLD | COMMUNITY
Start: 2018-01-09

## 2025-08-05 RX ORDER — DUTASTERIDE 0.5 MG
TAKE 1 CAPSULE DAILY CAPSULE ORAL
Refills: 0 | Status: ON HOLD | COMMUNITY

## 2025-08-05 RX ORDER — MONTELUKAST SODIUM 10 MG/1
1 TABLET TABLET, FILM COATED ORAL ONCE A DAY
Status: ACTIVE | COMMUNITY

## 2025-08-05 RX ORDER — LEVOTHYROXINE SODIUM 125 UG/1
1 CAPSULE IN THE MORNING ON AN EMPTY STOMACH CAPSULE ORAL ONCE A DAY
Refills: 0 | Status: ACTIVE | COMMUNITY
Start: 2018-09-14

## 2025-08-05 RX ORDER — TADALAFIL 20 MG/1
TAKE 1 TABLET DAILY 1 HOUR BEFORE NEEDED TABLET, FILM COATED ORAL
Refills: 0 | Status: ON HOLD | COMMUNITY
Start: 2018-06-14

## 2025-08-05 RX ORDER — POLYETHYLENE GLYCOL 3350, SODIUM CHLORIDE, SODIUM BICARBONATE AND POTASSIUM CHLORIDE WITH LEMON FLAVOR 420; 11.2; 5.72; 1.48 G/4L; G/4L; G/4L; G/4L
MIX CONTENTS DAY PRIOR TO PROCEDURE, BEGIN DRINKING SOLUTION AT 5PM UNTIL HALF GONE. COMPLETE SOLUTION 6 HOURS PRIOR TO PROCEDURE POWDER, FOR SOLUTION ORAL
Qty: 1 | Refills: 0 | Status: ON HOLD | COMMUNITY
Start: 2019-01-18

## 2025-08-05 RX ORDER — TADALAFIL 10 MG
TABLET ORAL
Qty: 15 | Refills: 0 | Status: ON HOLD | COMMUNITY
Start: 2018-02-14

## 2025-08-05 RX ORDER — METHOCARBAMOL 500 MG/1
TAKE 1 TABLET AS NEEDED TABLET, FILM COATED ORAL
Refills: 0 | Status: ON HOLD | COMMUNITY

## 2025-08-05 RX ORDER — TADALAFIL 20 MG/1
1 TABLET TABLET, COATED ORAL ONCE A DAY
Status: ACTIVE | COMMUNITY

## 2025-08-05 RX ORDER — ATORVASTATIN CALCIUM 20 MG/1
TAKE 1 TABLET DAILY TABLET, FILM COATED ORAL
Refills: 0 | Status: ACTIVE | COMMUNITY

## 2025-08-05 RX ORDER — AMOXICILLIN 500 MG/1
CAPSULE ORAL
Qty: 24 | Refills: 0 | Status: ON HOLD | COMMUNITY
Start: 2017-08-14

## 2025-08-05 RX ORDER — LEVOTHYROXINE SODIUM 0.17 MG/1
TAKE 1 TABLET DAILY TABLET ORAL
Refills: 0 | Status: ON HOLD | COMMUNITY